# Patient Record
Sex: FEMALE | Race: BLACK OR AFRICAN AMERICAN | NOT HISPANIC OR LATINO | URBAN - METROPOLITAN AREA
[De-identification: names, ages, dates, MRNs, and addresses within clinical notes are randomized per-mention and may not be internally consistent; named-entity substitution may affect disease eponyms.]

---

## 2019-06-19 ENCOUNTER — APPOINTMENT (RX ONLY)
Dept: URBAN - METROPOLITAN AREA CLINIC 349 | Facility: CLINIC | Age: 24
Setting detail: DERMATOLOGY
End: 2019-06-19

## 2019-06-19 DIAGNOSIS — L81.9 DISORDER OF PIGMENTATION, UNSPECIFIED: ICD-10-CM

## 2019-06-19 DIAGNOSIS — L70.0 ACNE VULGARIS: ICD-10-CM

## 2019-06-19 DIAGNOSIS — L20.89 OTHER ATOPIC DERMATITIS: ICD-10-CM

## 2019-06-19 PROBLEM — L20.84 INTRINSIC (ALLERGIC) ECZEMA: Status: ACTIVE | Noted: 2019-06-19

## 2019-06-19 PROCEDURE — ? COUNSELING

## 2019-06-19 PROCEDURE — ? RECOMMENDATIONS

## 2019-06-19 PROCEDURE — ? PRESCRIPTION

## 2019-06-19 PROCEDURE — 99202 OFFICE O/P NEW SF 15 MIN: CPT

## 2019-06-19 RX ORDER — SULFACETAMIDE SODIUM, SULFUR 98; 48 MG/G; MG/G
LIQUID TOPICAL
Qty: 1 | Refills: 3 | Status: ERX | COMMUNITY
Start: 2019-06-19

## 2019-06-19 RX ORDER — TRIAMCINOLONE ACETONIDE 1 MG/G
CREAM TOPICAL
Qty: 1 | Refills: 1 | Status: ERX | COMMUNITY
Start: 2019-06-19

## 2019-06-19 RX ADMIN — TRIAMCINOLONE ACETONIDE: 1 CREAM TOPICAL at 19:15

## 2019-06-19 RX ADMIN — SULFACETAMIDE SODIUM, SULFUR: 98; 48 LIQUID TOPICAL at 19:17

## 2019-06-19 ASSESSMENT — LOCATION DETAILED DESCRIPTION DERM
LOCATION DETAILED: LEFT SUPERIOR MEDIAL FOREHEAD
LOCATION DETAILED: LEFT SUPERIOR FOREHEAD
LOCATION DETAILED: RIGHT FOREHEAD
LOCATION DETAILED: RIGHT INFERIOR FOREHEAD
LOCATION DETAILED: RIGHT RADIAL DORSAL HAND
LOCATION DETAILED: LEFT LATERAL FOREHEAD
LOCATION DETAILED: RIGHT ANTERIOR PROXIMAL THIGH
LOCATION DETAILED: SUPERIOR MID FOREHEAD
LOCATION DETAILED: LEFT FOREHEAD
LOCATION DETAILED: RIGHT LATERAL FOREHEAD
LOCATION DETAILED: LEFT MEDIAL FOREHEAD

## 2019-06-19 ASSESSMENT — LOCATION ZONE DERM
LOCATION ZONE: FACE
LOCATION ZONE: HAND
LOCATION ZONE: FACE
LOCATION ZONE: LEG

## 2019-06-19 ASSESSMENT — LOCATION SIMPLE DESCRIPTION DERM
LOCATION SIMPLE: RIGHT HAND
LOCATION SIMPLE: RIGHT THIGH
LOCATION SIMPLE: RIGHT FOREHEAD
LOCATION SIMPLE: RIGHT FOREHEAD
LOCATION SIMPLE: SUPERIOR FOREHEAD
LOCATION SIMPLE: LEFT FOREHEAD
LOCATION SIMPLE: LEFT FOREHEAD

## 2019-06-19 NOTE — HPI: COSMETIC (UNWANTED HAIR)
How Did Your Unwanted Hair Appear?: sudden in onset
How Severe Is Your Unwanted Hair?: mild
Additional History: Patient has PCOS

## 2019-06-19 NOTE — PROCEDURE: COUNSELING
Benzoyl Peroxide Counseling: Patient counseled that medicine may cause skin irritation and bleach clothing.  In the event of skin irritation, the patient was advised to reduce the amount of the drug applied or use it less frequently.   The patient verbalized understanding of the proper use and possible adverse effects of benzoyl peroxide.  All of the patient's questions and concerns were addressed.
Isotretinoin Pregnancy And Lactation Text: This medication is Pregnancy Category X and is considered extremely dangerous during pregnancy. It is unknown if it is excreted in breast milk.
Birth Control Pills Pregnancy And Lactation Text: This medication should be avoided if pregnant and for the first 30 days post-partum.
Include Pregnancy/Lactation Warning?: No
Tazorac Counseling:  Patient advised that medication is irritating and drying.  Patient may need to apply sparingly and wash off after an hour before eventually leaving it on overnight.  The patient verbalized understanding of the proper use and possible adverse effects of tazorac.  All of the patient's questions and concerns were addressed.
Detail Level: Zone
Azithromycin Counseling:  I discussed with the patient the risks of azithromycin including but not limited to GI upset, allergic reaction, drug rash, diarrhea, and yeast infections.
Minocycline Pregnancy And Lactation Text: This medication is Pregnancy Category D and not consider safe during pregnancy. It is also excreted in breast milk.
Tazorac Pregnancy And Lactation Text: This medication is not safe during pregnancy. It is unknown if this medication is excreted in breast milk.
Erythromycin Pregnancy And Lactation Text: This medication is Pregnancy Category B and is considered safe during pregnancy. It is also excreted in breast milk.
Topical Clindamycin Pregnancy And Lactation Text: This medication is Pregnancy Category B and is considered safe during pregnancy. It is unknown if it is excreted in breast milk.
Isotretinoin Counseling: Patient should get monthly blood tests, not donate blood, not drive at night if vision affected, not share medication, and not undergo elective surgery for 6 months after tx completed. Side effects reviewed, pt to contact office should one occur.
Bactrim Pregnancy And Lactation Text: This medication is Pregnancy Category D and is known to cause fetal risk.  It is also excreted in breast milk.
Tetracycline Counseling: Patient counseled regarding possible photosensitivity and increased risk for sunburn.  Patient instructed to avoid sunlight, if possible.  When exposed to sunlight, patients should wear protective clothing, sunglasses, and sunscreen.  The patient was instructed to call the office immediately if the following severe adverse effects occur:  hearing changes, easy bruising/bleeding, severe headache, or vision changes.  The patient verbalized understanding of the proper use and possible adverse effects of tetracycline.  All of the patient's questions and concerns were addressed. Patient understands to avoid pregnancy while on therapy due to potential birth defects.
Minocycline Counseling: Patient advised regarding possible photosensitivity and discoloration of the teeth, skin, lips, tongue and gums.  Patient instructed to avoid sunlight, if possible.  When exposed to sunlight, patients should wear protective clothing, sunglasses, and sunscreen.  The patient was instructed to call the office immediately if the following severe adverse effects occur:  hearing changes, easy bruising/bleeding, severe headache, or vision changes.  The patient verbalized understanding of the proper use and possible adverse effects of minocycline.  All of the patient's questions and concerns were addressed.
Spironolactone Counseling: Patient advised regarding risks of diarrhea, abdominal pain, hyperkalemia, birth defects (for female patients), liver toxicity and renal toxicity. The patient may need blood work to monitor liver and kidney function and potassium levels while on therapy. The patient verbalized understanding of the proper use and possible adverse effects of spironolactone.  All of the patient's questions and concerns were addressed.
Spironolactone Pregnancy And Lactation Text: This medication can cause feminization of the male fetus and should be avoided during pregnancy. The active metabolite is also found in breast milk.
Topical Retinoid counseling:  Patient advised to apply a pea-sized amount only at bedtime and wait 30 minutes after washing their face before applying.  If too drying, patient may add a non-comedogenic moisturizer. The patient verbalized understanding of the proper use and possible adverse effects of retinoids.  All of the patient's questions and concerns were addressed.
High Dose Vitamin A Pregnancy And Lactation Text: High dose vitamin A therapy is contraindicated during pregnancy and breast feeding.
Azithromycin Pregnancy And Lactation Text: This medication is considered safe during pregnancy and is also secreted in breast milk.
Doxycycline Pregnancy And Lactation Text: This medication is Pregnancy Category D and not consider safe during pregnancy. It is also excreted in breast milk but is considered safe for shorter treatment courses.
Birth Control Pills Counseling: Birth Control Pill Counseling: I discussed with the patient the potential side effects of OCPs including but not limited to increased risk of stroke, heart attack, thrombophlebitis, deep venous thrombosis, hepatic adenomas, breast changes, GI upset, headaches, and depression.  The patient verbalized understanding of the proper use and possible adverse effects of OCPs. All of the patient's questions and concerns were addressed.
Bactrim Counseling:  I discussed with the patient the risks of sulfa antibiotics including but not limited to GI upset, allergic reaction, drug rash, diarrhea, dizziness, photosensitivity, and yeast infections.  Rarely, more serious reactions can occur including but not limited to aplastic anemia, agranulocytosis, methemoglobinemia, blood dyscrasias, liver or kidney failure, lung infiltrates or desquamative/blistering drug rashes.
Benzoyl Peroxide Pregnancy And Lactation Text: This medication is Pregnancy Category C. It is unknown if benzoyl peroxide is excreted in breast milk.
Erythromycin Counseling:  I discussed with the patient the risks of erythromycin including but not limited to GI upset, allergic reaction, drug rash, diarrhea, increase in liver enzymes, and yeast infections.
Topical Sulfur Applications Counseling: Topical Sulfur Counseling: Patient counseled that this medication may cause skin irritation or allergic reactions.  In the event of skin irritation, the patient was advised to reduce the amount of the drug applied or use it less frequently.   The patient verbalized understanding of the proper use and possible adverse effects of topical sulfur application.  All of the patient's questions and concerns were addressed.
Doxycycline Counseling:  Patient counseled regarding possible photosensitivity and increased risk for sunburn.  Patient instructed to avoid sunlight, if possible.  When exposed to sunlight, patients should wear protective clothing, sunglasses, and sunscreen.  The patient was instructed to call the office immediately if the following severe adverse effects occur:  hearing changes, easy bruising/bleeding, severe headache, or vision changes.  The patient verbalized understanding of the proper use and possible adverse effects of doxycycline.  All of the patient's questions and concerns were addressed.
Dapsone Counseling: I discussed with the patient the risks of dapsone including but not limited to hemolytic anemia, agranulocytosis, rashes, methemoglobinemia, kidney failure, peripheral neuropathy, headaches, GI upset, and liver toxicity.  Patients who start dapsone require monitoring including baseline LFTs and weekly CBCs for the first month, then every month thereafter.  The patient verbalized understanding of the proper use and possible adverse effects of dapsone.  All of the patient's questions and concerns were addressed.
Topical Clindamycin Counseling: Patient counseled that this medication may cause skin irritation or allergic reactions.  In the event of skin irritation, the patient was advised to reduce the amount of the drug applied or use it less frequently.   The patient verbalized understanding of the proper use and possible adverse effects of clindamycin.  All of the patient's questions and concerns were addressed.
Topical Sulfur Applications Pregnancy And Lactation Text: This medication is Pregnancy Category C and has an unknown safety profile during pregnancy. It is unknown if this topical medication is excreted in breast milk.
Topical Retinoid Pregnancy And Lactation Text: This medication is Pregnancy Category C. It is unknown if this medication is excreted in breast milk.
High Dose Vitamin A Counseling: Side effects reviewed, pt to contact office should one occur.
Dapsone Pregnancy And Lactation Text: This medication is Pregnancy Category C and is not considered safe during pregnancy or breast feeding.

## 2019-09-11 ENCOUNTER — APPOINTMENT (RX ONLY)
Dept: URBAN - METROPOLITAN AREA CLINIC 349 | Facility: CLINIC | Age: 24
Setting detail: DERMATOLOGY
End: 2019-09-11

## 2019-09-11 DIAGNOSIS — L20.89 OTHER ATOPIC DERMATITIS: ICD-10-CM | Status: STABLE

## 2019-09-11 DIAGNOSIS — L81.9 DISORDER OF PIGMENTATION, UNSPECIFIED: ICD-10-CM

## 2019-09-11 DIAGNOSIS — L70.0 ACNE VULGARIS: ICD-10-CM | Status: IMPROVED

## 2019-09-11 PROBLEM — L20.84 INTRINSIC (ALLERGIC) ECZEMA: Status: ACTIVE | Noted: 2019-09-11

## 2019-09-11 PROBLEM — I10 ESSENTIAL (PRIMARY) HYPERTENSION: Status: ACTIVE | Noted: 2019-09-11

## 2019-09-11 PROCEDURE — ? RECOMMENDATIONS

## 2019-09-11 PROCEDURE — ? PRESCRIPTION

## 2019-09-11 PROCEDURE — ? TREATMENT REGIMEN

## 2019-09-11 PROCEDURE — ? COUNSELING

## 2019-09-11 PROCEDURE — 99213 OFFICE O/P EST LOW 20 MIN: CPT

## 2019-09-11 RX ORDER — TRIAMCINOLONE ACETONIDE 1 MG/G
CREAM TOPICAL
Qty: 1 | Refills: 1 | Status: ERX

## 2019-09-11 RX ORDER — SULFACETAMIDE SODIUM, SULFUR 98; 48 MG/G; MG/G
LIQUID TOPICAL
Qty: 1 | Refills: 3 | Status: ERX

## 2019-09-11 RX ORDER — FLUTICASONE PROPIONATE 0.05 MG/G
OINTMENT TOPICAL
Qty: 1 | Refills: 1 | Status: ERX | COMMUNITY
Start: 2019-09-11

## 2019-09-11 RX ADMIN — FLUTICASONE PROPIONATE: 0.05 OINTMENT TOPICAL at 19:46

## 2019-09-11 ASSESSMENT — LOCATION DETAILED DESCRIPTION DERM
LOCATION DETAILED: RIGHT ANTERIOR PROXIMAL THIGH
LOCATION DETAILED: LEFT ANTERIOR PROXIMAL THIGH
LOCATION DETAILED: RIGHT RADIAL DORSAL HAND
LOCATION DETAILED: RIGHT LATERAL FOREHEAD
LOCATION DETAILED: LEFT LATERAL FOREHEAD
LOCATION DETAILED: RIGHT ANTERIOR PROXIMAL UPPER ARM
LOCATION DETAILED: LEFT ANTERIOR PROXIMAL UPPER ARM
LOCATION DETAILED: RIGHT INFERIOR FOREHEAD
LOCATION DETAILED: LEFT SUPERIOR FOREHEAD
LOCATION DETAILED: LEFT FOREHEAD
LOCATION DETAILED: LEFT MEDIAL FOREHEAD
LOCATION DETAILED: SUPERIOR MID FOREHEAD
LOCATION DETAILED: LEFT SUPERIOR MEDIAL FOREHEAD
LOCATION DETAILED: RIGHT FOREHEAD

## 2019-09-11 ASSESSMENT — LOCATION ZONE DERM
LOCATION ZONE: HAND
LOCATION ZONE: FACE
LOCATION ZONE: LEG
LOCATION ZONE: ARM
LOCATION ZONE: FACE

## 2019-09-11 ASSESSMENT — LOCATION SIMPLE DESCRIPTION DERM
LOCATION SIMPLE: LEFT UPPER ARM
LOCATION SIMPLE: SUPERIOR FOREHEAD
LOCATION SIMPLE: RIGHT UPPER ARM
LOCATION SIMPLE: RIGHT FOREHEAD
LOCATION SIMPLE: LEFT FOREHEAD
LOCATION SIMPLE: LEFT THIGH
LOCATION SIMPLE: RIGHT HAND
LOCATION SIMPLE: RIGHT THIGH
LOCATION SIMPLE: LEFT FOREHEAD
LOCATION SIMPLE: RIGHT FOREHEAD

## 2019-09-11 NOTE — PROCEDURE: COUNSELING
Spironolactone Counseling: Patient advised regarding risks of diarrhea, abdominal pain, hyperkalemia, birth defects (for female patients), liver toxicity and renal toxicity. The patient may need blood work to monitor liver and kidney function and potassium levels while on therapy. The patient verbalized understanding of the proper use and possible adverse effects of spironolactone.  All of the patient's questions and concerns were addressed.
Detail Level: Zone
Bactrim Pregnancy And Lactation Text: This medication is Pregnancy Category D and is known to cause fetal risk.  It is also excreted in breast milk.
Erythromycin Counseling:  I discussed with the patient the risks of erythromycin including but not limited to GI upset, allergic reaction, drug rash, diarrhea, increase in liver enzymes, and yeast infections.
Benzoyl Peroxide Pregnancy And Lactation Text: This medication is Pregnancy Category C. It is unknown if benzoyl peroxide is excreted in breast milk.
Tazorac Pregnancy And Lactation Text: This medication is not safe during pregnancy. It is unknown if this medication is excreted in breast milk.
High Dose Vitamin A Counseling: Side effects reviewed, pt to contact office should one occur.
Dapsone Pregnancy And Lactation Text: This medication is Pregnancy Category C and is not considered safe during pregnancy or breast feeding.
Benzoyl Peroxide Counseling: Patient counseled that medicine may cause skin irritation and bleach clothing.  In the event of skin irritation, the patient was advised to reduce the amount of the drug applied or use it less frequently.   The patient verbalized understanding of the proper use and possible adverse effects of benzoyl peroxide.  All of the patient's questions and concerns were addressed.
Spironolactone Pregnancy And Lactation Text: This medication can cause feminization of the male fetus and should be avoided during pregnancy. The active metabolite is also found in breast milk.
Topical Sulfur Applications Counseling: Topical Sulfur Counseling: Patient counseled that this medication may cause skin irritation or allergic reactions.  In the event of skin irritation, the patient was advised to reduce the amount of the drug applied or use it less frequently.   The patient verbalized understanding of the proper use and possible adverse effects of topical sulfur application.  All of the patient's questions and concerns were addressed.
Dapsone Counseling: I discussed with the patient the risks of dapsone including but not limited to hemolytic anemia, agranulocytosis, rashes, methemoglobinemia, kidney failure, peripheral neuropathy, headaches, GI upset, and liver toxicity.  Patients who start dapsone require monitoring including baseline LFTs and weekly CBCs for the first month, then every month thereafter.  The patient verbalized understanding of the proper use and possible adverse effects of dapsone.  All of the patient's questions and concerns were addressed.
Doxycycline Counseling:  Patient counseled regarding possible photosensitivity and increased risk for sunburn.  Patient instructed to avoid sunlight, if possible.  When exposed to sunlight, patients should wear protective clothing, sunglasses, and sunscreen.  The patient was instructed to call the office immediately if the following severe adverse effects occur:  hearing changes, easy bruising/bleeding, severe headache, or vision changes.  The patient verbalized understanding of the proper use and possible adverse effects of doxycycline.  All of the patient's questions and concerns were addressed.
Use Enhanced Medication Counseling?: No
High Dose Vitamin A Pregnancy And Lactation Text: High dose vitamin A therapy is contraindicated during pregnancy and breast feeding.
Tazorac Counseling:  Patient advised that medication is irritating and drying.  Patient may need to apply sparingly and wash off after an hour before eventually leaving it on overnight.  The patient verbalized understanding of the proper use and possible adverse effects of tazorac.  All of the patient's questions and concerns were addressed.
Birth Control Pills Pregnancy And Lactation Text: This medication should be avoided if pregnant and for the first 30 days post-partum.
Tetracycline Counseling: Patient counseled regarding possible photosensitivity and increased risk for sunburn.  Patient instructed to avoid sunlight, if possible.  When exposed to sunlight, patients should wear protective clothing, sunglasses, and sunscreen.  The patient was instructed to call the office immediately if the following severe adverse effects occur:  hearing changes, easy bruising/bleeding, severe headache, or vision changes.  The patient verbalized understanding of the proper use and possible adverse effects of tetracycline.  All of the patient's questions and concerns were addressed. Patient understands to avoid pregnancy while on therapy due to potential birth defects.
Bactrim Counseling:  I discussed with the patient the risks of sulfa antibiotics including but not limited to GI upset, allergic reaction, drug rash, diarrhea, dizziness, photosensitivity, and yeast infections.  Rarely, more serious reactions can occur including but not limited to aplastic anemia, agranulocytosis, methemoglobinemia, blood dyscrasias, liver or kidney failure, lung infiltrates or desquamative/blistering drug rashes.
Minocycline Counseling: Patient advised regarding possible photosensitivity and discoloration of the teeth, skin, lips, tongue and gums.  Patient instructed to avoid sunlight, if possible.  When exposed to sunlight, patients should wear protective clothing, sunglasses, and sunscreen.  The patient was instructed to call the office immediately if the following severe adverse effects occur:  hearing changes, easy bruising/bleeding, severe headache, or vision changes.  The patient verbalized understanding of the proper use and possible adverse effects of minocycline.  All of the patient's questions and concerns were addressed.
Topical Retinoid counseling:  Patient advised to apply a pea-sized amount only at bedtime and wait 30 minutes after washing their face before applying.  If too drying, patient may add a non-comedogenic moisturizer. The patient verbalized understanding of the proper use and possible adverse effects of retinoids.  All of the patient's questions and concerns were addressed.
Doxycycline Pregnancy And Lactation Text: This medication is Pregnancy Category D and not consider safe during pregnancy. It is also excreted in breast milk but is considered safe for shorter treatment courses.
Erythromycin Pregnancy And Lactation Text: This medication is Pregnancy Category B and is considered safe during pregnancy. It is also excreted in breast milk.
Topical Clindamycin Counseling: Patient counseled that this medication may cause skin irritation or allergic reactions.  In the event of skin irritation, the patient was advised to reduce the amount of the drug applied or use it less frequently.   The patient verbalized understanding of the proper use and possible adverse effects of clindamycin.  All of the patient's questions and concerns were addressed.
Azithromycin Counseling:  I discussed with the patient the risks of azithromycin including but not limited to GI upset, allergic reaction, drug rash, diarrhea, and yeast infections.
Isotretinoin Counseling: Patient should get monthly blood tests, not donate blood, not drive at night if vision affected, not share medication, and not undergo elective surgery for 6 months after tx completed. Side effects reviewed, pt to contact office should one occur.
Topical Clindamycin Pregnancy And Lactation Text: This medication is Pregnancy Category B and is considered safe during pregnancy. It is unknown if it is excreted in breast milk.
Isotretinoin Pregnancy And Lactation Text: This medication is Pregnancy Category X and is considered extremely dangerous during pregnancy. It is unknown if it is excreted in breast milk.
Minocycline Pregnancy And Lactation Text: This medication is Pregnancy Category D and not consider safe during pregnancy. It is also excreted in breast milk.
Birth Control Pills Counseling: Birth Control Pill Counseling: I discussed with the patient the potential side effects of OCPs including but not limited to increased risk of stroke, heart attack, thrombophlebitis, deep venous thrombosis, hepatic adenomas, breast changes, GI upset, headaches, and depression.  The patient verbalized understanding of the proper use and possible adverse effects of OCPs. All of the patient's questions and concerns were addressed.
Topical Retinoid Pregnancy And Lactation Text: This medication is Pregnancy Category C. It is unknown if this medication is excreted in breast milk.
Topical Sulfur Applications Pregnancy And Lactation Text: This medication is Pregnancy Category C and has an unknown safety profile during pregnancy. It is unknown if this topical medication is excreted in breast milk.
Azithromycin Pregnancy And Lactation Text: This medication is considered safe during pregnancy and is also secreted in breast milk.

## 2020-10-22 NOTE — PROGRESS NOTES
CC:  Large breasts with pain    HPI:  26 yo BF several years hx of very large breasts, asymmetry, and upper back, neck base and shoulder pain. No breast gland symptoms. Has obesity. 5'8\" 280 #. Breasts 38 G+, wants D cup size. Meds: Amlodipine, Phentermine    NKDA    Exam:        Breasts:  Large, ptotic, w/o masses, LEFT larger possible 400 gm+. Estimate resection R 800+, L 1000+    A/P    Symptomatic breast hypertrophy for reduction mammaplasty.

## 2020-10-28 ENCOUNTER — HOSPITAL ENCOUNTER (OUTPATIENT)
Dept: SURGERY | Age: 25
Discharge: HOME | End: 2020-10-28

## 2020-10-28 VITALS — HEIGHT: 68 IN | BODY MASS INDEX: 42.44 KG/M2 | WEIGHT: 280 LBS

## 2020-10-28 RX ORDER — AMLODIPINE BESYLATE 10 MG/1
10 TABLET ORAL DAILY
COMMUNITY
End: 2021-02-03

## 2020-10-28 RX ORDER — BISMUTH SUBSALICYLATE 262 MG
1 TABLET,CHEWABLE ORAL DAILY
COMMUNITY
End: 2021-02-02

## 2020-10-28 NOTE — PERIOP NOTES
Patient verified name and . Order for consent NOT found in EHR; patient verifies procedure. Type 2 surgery, phone assessment complete. Orders NOT received. Labs per surgeon: no orders in EMR- pt states she has preop appt today  Labs per anesthesia protocol: hgb needed- Pt instructed to go to O/P lab ([facility], [place] [address]) - between the hours of [id number] prior to the day of surgery. Pt verbalized understanding and states can go on Friday. Patient answered medical/surgical history questions at their best of ability. All prior to admission medications documented in [facility]. Patient instructed to take the following medications the day of surgery according to anesthesia guidelines with a small sip of water: amlodipine. Hold all vitamins 7 days prior to surgery and NSAIDS 5 days prior to surgery. Prescription meds to hold: none. Patient instructed on the following:    > a negative Covid swab result is required to proceed with surgery;  appointments are made by the surgeon office and test should be collected 7 days prior to surgery. The testing center is located at the [facility] [address]. Pt states surgeon office told her the hospital would be calling her to make appt so at this time, pt has not had COVID screening. Pt instructed to confirm details with surgeon's office today at preop appt- pt aware PAT dept does not make these appts. > 1 visitor allowed at this time. >Arrive at [facility], C Entrance, time of arrival to be called the day before by 1700. >NPO after midnight including gum, mints, and ice chips. >Responsible adult must drive patient to the hospital, stay during surgery, and patient will need supervision 24 hours after anesthesia. >Use antibacterial soap in shower the night before surgery and on the morning of surgery.   >All piercings must be removed prior to arrival.    >Leave all valuables (money and jewelry) at home but bring insurance card and ID on       DOS. >Do not wear make-up, nail polish, lotions, cologne, perfumes, powders, or oil on skin.

## 2020-10-30 ENCOUNTER — HOSPITAL ENCOUNTER (OUTPATIENT)
Dept: LAB | Age: 25
Discharge: HOME | End: 2020-10-30
Attending: PLASTIC SURGERY
Payer: COMMERCIAL

## 2020-10-30 LAB — HGB BLD-MCNC: 11.5 G/DL (ref 11.7–15.4)

## 2020-10-30 PROCEDURE — 36415 COLL VENOUS BLD VENIPUNCTURE: CPT

## 2020-10-30 PROCEDURE — 85018 HEMOGLOBIN: CPT

## 2020-11-02 ENCOUNTER — ANESTHESIA EVENT (OUTPATIENT)
Dept: SURGERY | Age: 25
End: 2020-11-02
Payer: COMMERCIAL

## 2020-11-02 NOTE — PERIOP NOTES
HGB done 10/30/20 within [facility] protocols. Results for Jacquie Temple (MRN [id number]) as of 11/2/2020 10:15   Ref.  Range 10/30/2020 15:05   HGB Latest Ref Range: 11.7 - 15.4 g/dL 11.5 (L)

## 2020-11-02 NOTE — PERIOP NOTES
HGB done 10/30/20 within anesthesia protocols.     HEMOGLOBIN [[id number]] (Order [id number])   Lab   Date: 10/30/2020  Department: [facility]  Released By: Talha Brito RN (auto-released)  Authorizing: Esteban Trammell MD    Component  Value  Flag  Ref Range  Units  Status    HGB  11.5  Low

## 2020-11-03 ENCOUNTER — HOSPITAL ENCOUNTER (OUTPATIENT)
Age: 25
Setting detail: OUTPATIENT SURGERY
Discharge: HOME | End: 2020-11-03
Attending: PLASTIC SURGERY | Admitting: PLASTIC SURGERY
Payer: COMMERCIAL

## 2020-11-03 ENCOUNTER — ANESTHESIA (OUTPATIENT)
Dept: SURGERY | Age: 25
End: 2020-11-03
Payer: COMMERCIAL

## 2020-11-03 VITALS
TEMPERATURE: 97.9 F | BODY MASS INDEX: 44.41 KG/M2 | WEIGHT: 293 LBS | DIASTOLIC BLOOD PRESSURE: 79 MMHG | RESPIRATION RATE: 16 BRPM | HEART RATE: 78 BPM | OXYGEN SATURATION: 100 % | SYSTOLIC BLOOD PRESSURE: 134 MMHG | HEIGHT: 68 IN

## 2020-11-03 LAB — HCG UR QL: NEGATIVE

## 2020-11-03 PROCEDURE — 77030008534 HC TBNG LIPOSUC BYRO -B: Performed by: PLASTIC SURGERY

## 2020-11-03 PROCEDURE — 74011250636 HC RX REV CODE- 250/636: Performed by: ANESTHESIOLOGY

## 2020-11-03 PROCEDURE — 76010000173 HC OR TIME 3 TO 3.5 HR INTENSV-TIER 1: Performed by: PLASTIC SURGERY

## 2020-11-03 PROCEDURE — 77030008459 HC STPLR SKN COOP -B: Performed by: PLASTIC SURGERY

## 2020-11-03 PROCEDURE — 88307 TISSUE EXAM BY PATHOLOGIST: CPT

## 2020-11-03 PROCEDURE — 77030037088 HC TUBE ENDOTRACH ORAL NSL COVD-A: Performed by: ANESTHESIOLOGY

## 2020-11-03 PROCEDURE — 77030040361 HC SLV COMPR DVT MDII -B: Performed by: PLASTIC SURGERY

## 2020-11-03 PROCEDURE — 77030039425 HC BLD LARYNG TRULITE DISP TELE -A: Performed by: ANESTHESIOLOGY

## 2020-11-03 PROCEDURE — 74011250636 HC RX REV CODE- 250/636: Performed by: NURSE ANESTHETIST, CERTIFIED REGISTERED

## 2020-11-03 PROCEDURE — 76060000037 HC ANESTHESIA 3 TO 3.5 HR: Performed by: PLASTIC SURGERY

## 2020-11-03 PROCEDURE — 74011250636 HC RX REV CODE- 250/636: Performed by: PLASTIC SURGERY

## 2020-11-03 PROCEDURE — 77030031139 HC SUT VCRL2 J&J -A: Performed by: PLASTIC SURGERY

## 2020-11-03 PROCEDURE — 2709999900 HC NON-CHARGEABLE SUPPLY: Performed by: PLASTIC SURGERY

## 2020-11-03 PROCEDURE — 77030019908 HC STETH ESOPH SIMS -A: Performed by: NURSE ANESTHETIST, CERTIFIED REGISTERED

## 2020-11-03 PROCEDURE — 77030002966 HC SUT PDS J&J -A: Performed by: PLASTIC SURGERY

## 2020-11-03 PROCEDURE — 77030027515 HC TBNG LIPO SUC MDCO -B: Performed by: PLASTIC SURGERY

## 2020-11-03 PROCEDURE — 74011000250 HC RX REV CODE- 250: Performed by: NURSE ANESTHETIST, CERTIFIED REGISTERED

## 2020-11-03 PROCEDURE — 81025 URINE PREGNANCY TEST: CPT

## 2020-11-03 PROCEDURE — 77030040922 HC BLNKT HYPOTHRM STRY -A: Performed by: NURSE ANESTHETIST, CERTIFIED REGISTERED

## 2020-11-03 PROCEDURE — 76210000006 HC OR PH I REC 0.5 TO 1 HR: Performed by: PLASTIC SURGERY

## 2020-11-03 PROCEDURE — 74011000258 HC RX REV CODE- 258: Performed by: PLASTIC SURGERY

## 2020-11-03 PROCEDURE — 74011250637 HC RX REV CODE- 250/637: Performed by: ANESTHESIOLOGY

## 2020-11-03 PROCEDURE — 76210000020 HC REC RM PH II FIRST 0.5 HR: Performed by: PLASTIC SURGERY

## 2020-11-03 PROCEDURE — 74011000250 HC RX REV CODE- 250: Performed by: ANESTHESIOLOGY

## 2020-11-03 PROCEDURE — 74011000250 HC RX REV CODE- 250: Performed by: PLASTIC SURGERY

## 2020-11-03 RX ORDER — NEOSTIGMINE METHYLSULFATE 1 MG/ML
INJECTION, SOLUTION INTRAVENOUS AS NEEDED
Status: DISCONTINUED | OUTPATIENT
Start: 2020-11-03 | End: 2020-11-03 | Stop reason: HOSPADM

## 2020-11-03 RX ORDER — HYDROCODONE BITARTRATE AND ACETAMINOPHEN 5; 325 MG/1; MG/1
2 TABLET ORAL AS NEEDED
Status: DISCONTINUED | OUTPATIENT
Start: 2020-11-03 | End: 2020-11-03 | Stop reason: HOSPADM

## 2020-11-03 RX ORDER — BACITRACIN ZINC 500 UNIT/G
OINTMENT (GRAM) TOPICAL AS NEEDED
Status: DISCONTINUED | OUTPATIENT
Start: 2020-11-03 | End: 2020-11-03 | Stop reason: HOSPADM

## 2020-11-03 RX ORDER — BUPIVACAINE HYDROCHLORIDE 5 MG/ML
INJECTION, SOLUTION EPIDURAL; INTRACAUDAL AS NEEDED
Status: DISCONTINUED | OUTPATIENT
Start: 2020-11-03 | End: 2020-11-03 | Stop reason: HOSPADM

## 2020-11-03 RX ORDER — GLYCOPYRROLATE 0.2 MG/ML
INJECTION INTRAMUSCULAR; INTRAVENOUS AS NEEDED
Status: DISCONTINUED | OUTPATIENT
Start: 2020-11-03 | End: 2020-11-03 | Stop reason: HOSPADM

## 2020-11-03 RX ORDER — FENTANYL CITRATE 50 UG/ML
INJECTION, SOLUTION INTRAMUSCULAR; INTRAVENOUS AS NEEDED
Status: DISCONTINUED | OUTPATIENT
Start: 2020-11-03 | End: 2020-11-03 | Stop reason: HOSPADM

## 2020-11-03 RX ORDER — MIDAZOLAM HYDROCHLORIDE 1 MG/ML
2 INJECTION, SOLUTION INTRAMUSCULAR; INTRAVENOUS
Status: COMPLETED | OUTPATIENT
Start: 2020-11-03 | End: 2020-11-03

## 2020-11-03 RX ORDER — OXYCODONE HYDROCHLORIDE 5 MG/1
5 TABLET ORAL
Status: DISCONTINUED | OUTPATIENT
Start: 2020-11-03 | End: 2020-11-03 | Stop reason: HOSPADM

## 2020-11-03 RX ORDER — CELECOXIB 200 MG/1
200 CAPSULE ORAL ONCE
COMMUNITY
End: 2021-02-02

## 2020-11-03 RX ORDER — SODIUM CHLORIDE, SODIUM LACTATE, POTASSIUM CHLORIDE, CALCIUM CHLORIDE 600; 310; 30; 20 MG/100ML; MG/100ML; MG/100ML; MG/100ML
75 INJECTION, SOLUTION INTRAVENOUS CONTINUOUS
Status: DISCONTINUED | OUTPATIENT
Start: 2020-11-03 | End: 2020-11-03 | Stop reason: HOSPADM

## 2020-11-03 RX ORDER — PROPOFOL 10 MG/ML
INJECTION, EMULSION INTRAVENOUS AS NEEDED
Status: DISCONTINUED | OUTPATIENT
Start: 2020-11-03 | End: 2020-11-03 | Stop reason: HOSPADM

## 2020-11-03 RX ORDER — DEXAMETHASONE SODIUM PHOSPHATE 4 MG/ML
INJECTION, SOLUTION INTRA-ARTICULAR; INTRALESIONAL; INTRAMUSCULAR; INTRAVENOUS; SOFT TISSUE AS NEEDED
Status: DISCONTINUED | OUTPATIENT
Start: 2020-11-03 | End: 2020-11-03 | Stop reason: HOSPADM

## 2020-11-03 RX ORDER — LIDOCAINE HYDROCHLORIDE 20 MG/ML
INJECTION, SOLUTION EPIDURAL; INFILTRATION; INTRACAUDAL; PERINEURAL AS NEEDED
Status: DISCONTINUED | OUTPATIENT
Start: 2020-11-03 | End: 2020-11-03 | Stop reason: HOSPADM

## 2020-11-03 RX ORDER — CEFAZOLIN SODIUM 1 G/3ML
INJECTION, POWDER, FOR SOLUTION INTRAMUSCULAR; INTRAVENOUS AS NEEDED
Status: DISCONTINUED | OUTPATIENT
Start: 2020-11-03 | End: 2020-11-03 | Stop reason: HOSPADM

## 2020-11-03 RX ORDER — HYDROMORPHONE HYDROCHLORIDE 2 MG/ML
0.5 INJECTION, SOLUTION INTRAMUSCULAR; INTRAVENOUS; SUBCUTANEOUS
Status: DISCONTINUED | OUTPATIENT
Start: 2020-11-03 | End: 2020-11-03 | Stop reason: HOSPADM

## 2020-11-03 RX ORDER — FENTANYL CITRATE 50 UG/ML
100 INJECTION, SOLUTION INTRAMUSCULAR; INTRAVENOUS ONCE
Status: DISCONTINUED | OUTPATIENT
Start: 2020-11-03 | End: 2020-11-03 | Stop reason: HOSPADM

## 2020-11-03 RX ORDER — SODIUM CHLORIDE 0.9 % (FLUSH) 0.9 %
5-40 SYRINGE (ML) INJECTION EVERY 8 HOURS
Status: CANCELLED | OUTPATIENT
Start: 2020-11-03

## 2020-11-03 RX ORDER — ROCURONIUM BROMIDE 10 MG/ML
INJECTION, SOLUTION INTRAVENOUS AS NEEDED
Status: DISCONTINUED | OUTPATIENT
Start: 2020-11-03 | End: 2020-11-03 | Stop reason: HOSPADM

## 2020-11-03 RX ORDER — CEFAZOLIN SODIUM/WATER 2 G/20 ML
SYRINGE (ML) INTRAVENOUS AS NEEDED
Status: DISCONTINUED | OUTPATIENT
Start: 2020-11-03 | End: 2020-11-03 | Stop reason: HOSPADM

## 2020-11-03 RX ORDER — LIDOCAINE HYDROCHLORIDE 10 MG/ML
0.1 INJECTION INFILTRATION; PERINEURAL AS NEEDED
Status: DISCONTINUED | OUTPATIENT
Start: 2020-11-03 | End: 2020-11-03 | Stop reason: HOSPADM

## 2020-11-03 RX ORDER — CEFAZOLIN SODIUM/WATER 2 G/20 ML
2 SYRINGE (ML) INTRAVENOUS ONCE
Status: CANCELLED | OUTPATIENT
Start: 2020-11-03 | End: 2020-11-03

## 2020-11-03 RX ORDER — SODIUM CHLORIDE 0.9 % (FLUSH) 0.9 %
5-40 SYRINGE (ML) INJECTION AS NEEDED
Status: CANCELLED | OUTPATIENT
Start: 2020-11-03

## 2020-11-03 RX ORDER — ONDANSETRON 2 MG/ML
INJECTION INTRAMUSCULAR; INTRAVENOUS AS NEEDED
Status: DISCONTINUED | OUTPATIENT
Start: 2020-11-03 | End: 2020-11-03 | Stop reason: HOSPADM

## 2020-11-03 RX ORDER — CEFAZOLIN SODIUM/WATER 2 G/20 ML
SYRINGE (ML) INTRAVENOUS AS NEEDED
Status: DISCONTINUED | OUTPATIENT
Start: 2020-11-03 | End: 2020-11-03

## 2020-11-03 RX ADMIN — FENTANYL CITRATE 50 MCG: 50 INJECTION INTRAMUSCULAR; INTRAVENOUS at 11:21

## 2020-11-03 RX ADMIN — ROCURONIUM BROMIDE 40 MG: 10 INJECTION, SOLUTION INTRAVENOUS at 10:09

## 2020-11-03 RX ADMIN — FENTANYL CITRATE 50 MCG: 50 INJECTION INTRAMUSCULAR; INTRAVENOUS at 11:24

## 2020-11-03 RX ADMIN — FENTANYL CITRATE 50 MCG: 50 INJECTION INTRAMUSCULAR; INTRAVENOUS at 10:02

## 2020-11-03 RX ADMIN — Medication 3 MG: at 12:58

## 2020-11-03 RX ADMIN — MIDAZOLAM 2 MG: 1 INJECTION INTRAMUSCULAR; INTRAVENOUS at 09:48

## 2020-11-03 RX ADMIN — HYDROMORPHONE HYDROCHLORIDE 0.5 MG: 2 INJECTION INTRAMUSCULAR; INTRAVENOUS; SUBCUTANEOUS at 13:41

## 2020-11-03 RX ADMIN — DEXAMETHASONE SODIUM PHOSPHATE 10 MG: 4 INJECTION, SOLUTION INTRAMUSCULAR; INTRAVENOUS at 10:20

## 2020-11-03 RX ADMIN — Medication 3 G: at 10:12

## 2020-11-03 RX ADMIN — LIDOCAINE HYDROCHLORIDE 0.1 ML: 10 INJECTION, SOLUTION INFILTRATION; PERINEURAL at 09:38

## 2020-11-03 RX ADMIN — PROPOFOL 100 MG: 10 INJECTION, EMULSION INTRAVENOUS at 11:24

## 2020-11-03 RX ADMIN — SODIUM CHLORIDE, SODIUM LACTATE, POTASSIUM CHLORIDE, AND CALCIUM CHLORIDE: 600; 310; 30; 20 INJECTION, SOLUTION INTRAVENOUS at 09:35

## 2020-11-03 RX ADMIN — ONDANSETRON 4 MG: 2 INJECTION INTRAMUSCULAR; INTRAVENOUS at 12:51

## 2020-11-03 RX ADMIN — ROCURONIUM BROMIDE 10 MG: 10 INJECTION, SOLUTION INTRAVENOUS at 11:23

## 2020-11-03 RX ADMIN — FENTANYL CITRATE 50 MCG: 50 INJECTION INTRAMUSCULAR; INTRAVENOUS at 10:11

## 2020-11-03 RX ADMIN — GLYCOPYRROLATE 0.4 MG: 0.2 INJECTION, SOLUTION INTRAMUSCULAR; INTRAVENOUS at 12:57

## 2020-11-03 RX ADMIN — PROPOFOL 200 MG: 10 INJECTION, EMULSION INTRAVENOUS at 10:07

## 2020-11-03 RX ADMIN — SODIUM CHLORIDE, SODIUM LACTATE, POTASSIUM CHLORIDE, AND CALCIUM CHLORIDE: 600; 310; 30; 20 INJECTION, SOLUTION INTRAVENOUS at 11:52

## 2020-11-03 RX ADMIN — SODIUM CHLORIDE, SODIUM LACTATE, POTASSIUM CHLORIDE, AND CALCIUM CHLORIDE 75 ML/HR: 600; 310; 30; 20 INJECTION, SOLUTION INTRAVENOUS at 09:39

## 2020-11-03 RX ADMIN — HYDROCODONE BITARTRATE AND ACETAMINOPHEN 2 TABLET: 5; 325 TABLET ORAL at 14:18

## 2020-11-03 RX ADMIN — LIDOCAINE HYDROCHLORIDE 80 MG: 20 INJECTION, SOLUTION EPIDURAL; INFILTRATION; INTRACAUDAL; PERINEURAL at 10:06

## 2020-11-03 NOTE — DISCHARGE INSTRUCTIONS
BREAST SURGERY    ACTIVITY  · As tolerated and as directed by your doctor. · Bathe or shower as directed by your doctor. · Avoid heavy lifting or pulling (no more than 5 pounds). · Wear bra 24 hours a day if comfortable. DIET  · Clear liquids until no nausea or vomiting; then light diet for the first day. · Advance to regular diet on second day, unless directed by your doctor. · If nausea and vomiting continues, call your doctor. PAIN  · Take pain medication as directed by your doctor. · Call your doctor if pain is  NOT relieved by medication. · DO NOT take aspirin or blood thinners until directed by your doctor. DRESSING CARE : Per Surgeon Instructions, Keep intact until post operative appointment. CALL YOUR DOCTOR IF   · Excessive bleeding that does not stop after holding mild pressure over the area. After general anesthesia or intravenous sedation, for 24 hours or while taking prescription Narcotics:  · Limit your activities  · Do not drive and operate hazardous machinery  · Do not make important personal or business decisions  · Do  not drink alcoholic beverages  · If you have not urinated within 8 hours after discharge, please contact your surgeon on call. *  Please give a list of your current medications to your Primary Care Provider. *  Please update this list whenever your medications are discontinued, doses are      changed, or new medications (including over-the-counter products) are added. *  Please carry medication information at all times in case of emergency situations. These are general instructions for a healthy lifestyle:    No smoking/ No tobacco products/ Avoid exposure to second hand smoke    Surgeon General's Warning:  Quitting smoking now greatly reduces serious risk to your health.     Obesity, smoking, and sedentary lifestyle greatly increases your risk for illness    A healthy diet, regular physical exercise & weight monitoring are important for maintaining a healthy lifestyle    You may be retaining fluid if you have a history of heart failure or if you experience any of the following symptoms:  Weight gain of 3 pounds or more overnight or 5 pounds in a week, increased swelling in our hands or feet or shortness of breath while lying flat in bed. Please call your doctor as soon as you notice any of these symptoms; do not wait until your next office visit. Recognize signs and symptoms of STROKE:    F-face looks uneven    A-arms unable to move or move unevenly    S-speech slurred or non-existent    T-time-call 911 as soon as signs and symptoms begin-DO NOT go       Back to bed or wait to see if you get better-TIME IS BRAIN.

## 2020-11-03 NOTE — ANESTHESIA POSTPROCEDURE EVALUATION
Procedure(s):  BREAST REDUCTION BILATERAL.     general    Anesthesia Post Evaluation      Multimodal analgesia: multimodal analgesia used between 6 hours prior to anesthesia start to PACU discharge  Patient location during evaluation: PACU  Patient participation: complete - patient participated  Level of consciousness: awake and alert  Pain management: adequate  Airway patency: patent  Anesthetic complications: no  Cardiovascular status: acceptable  Respiratory status: acceptable  Hydration status: acceptable  Post anesthesia nausea and vomiting:  none  Final Post Anesthesia Temperature Assessment:  Normothermia (36.0-37.5 degrees C)      INITIAL Post-op Vital signs:   Vitals Value Taken Time   /79 11/3/2020  1:53 PM   Temp 36.6 °C (97.9 °F) 11/3/2020  1:23 PM   Pulse 78 11/3/2020  1:53 PM   Resp 16 11/3/2020  1:53 PM   SpO2 100 % 11/3/2020  1:53 PM

## 2020-11-03 NOTE — H&P
History and Physical      Review of Systems    Physical Exam    CC:  Large breasts and associated symptoms    HPI:  26 yo BF with years hx very large breasts, shoulder, upper back and neck base pain. Breasts did not get smaller when she lost weight previously. 34 G+ cup, wants D cup. I estimated 40 F cup by measurement. Left larger, possibly 400 gm. PMH: HBP, o/w healthy    NKDA    EXAM:  Alert, oriented  Chest clear  Heart  RRR, no M  Breasts asymmetric, L larger, ptotic, no masses    A/P:  Symptomatic breast hypertrophy and asymmetry for reduction mammaplasty.

## 2020-11-03 NOTE — ANESTHESIA PREPROCEDURE EVALUATION
Relevant Problems   No relevant active problems       Anesthetic History               Review of Systems / Medical History  Patient summary reviewed and pertinent labs reviewed    Pulmonary                   Neuro/Psych              Cardiovascular    Hypertension              Exercise tolerance: >4 METS     GI/Hepatic/Renal                Endo/Other        Morbid obesity     Other Findings            Physical Exam    Airway  Mallampati: II  TM Distance: > 6 cm  Neck ROM: normal range of motion   Mouth opening: Normal     Cardiovascular  Regular rate and rhythm,  S1 and S2 normal,  no murmur, click, rub, or gallop             Dental  No notable dental hx       Pulmonary  Breath sounds clear to auscultation               Abdominal         Other Findings            Anesthetic Plan    ASA: 3  Anesthesia type: general            Anesthetic plan and risks discussed with: Patient

## 2021-03-03 PROBLEM — O99.019 ANEMIA COMPLICATING PREGNANCY: Status: ACTIVE | Noted: 2021-03-03

## 2021-03-11 PROBLEM — E66.01 CLASS 3 SEVERE OBESITY DUE TO EXCESS CALORIES WITH BODY MASS INDEX (BMI) OF 45.0 TO 49.9 IN ADULT (HCC): Status: ACTIVE | Noted: 2021-03-11

## 2021-03-11 PROBLEM — O99.210 OBESITY COMPLICATING PREGNANCY: Status: ACTIVE | Noted: 2021-03-11

## 2021-03-11 PROBLEM — O09.90 HIGH-RISK PREGNANCY: Status: ACTIVE | Noted: 2021-03-11

## 2021-03-11 PROBLEM — O10.919 CHRONIC HYPERTENSION AFFECTING PREGNANCY: Status: ACTIVE | Noted: 2021-03-11

## 2021-03-16 PROBLEM — A74.9 CHLAMYDIA INFECTION AFFECTING PREGNANCY IN FIRST TRIMESTER: Status: ACTIVE | Noted: 2021-03-16

## 2021-03-16 PROBLEM — O98.811 CHLAMYDIA INFECTION AFFECTING PREGNANCY IN FIRST TRIMESTER: Status: ACTIVE | Noted: 2021-03-16

## 2021-03-22 PROBLEM — Z3A.13 13 WEEKS GESTATION OF PREGNANCY: Status: ACTIVE | Noted: 2021-03-22

## 2021-03-22 PROBLEM — F41.9 ANXIETY: Status: ACTIVE | Noted: 2019-05-17

## 2021-03-29 ENCOUNTER — TELEPHONE (OUTPATIENT)
Dept: NUTRITION | Age: 26
End: 2021-03-29

## 2021-03-29 NOTE — TELEPHONE ENCOUNTER
Nutrition Counseling: Contacted pt regarding referral. See notes documented in Nutrition Counseling Referral for details. No further follow-up contact from pt. Will close referral for this office.     [facility]  [phone]

## 2021-04-12 PROBLEM — Z3A.13 13 WEEKS GESTATION OF PREGNANCY: Status: RESOLVED | Noted: 2021-03-22 | Resolved: 2021-04-12

## 2021-05-10 PROBLEM — O99.012 ANEMIA AFFECTING PREGNANCY IN SECOND TRIMESTER: Status: ACTIVE | Noted: 2021-03-03

## 2021-05-10 PROBLEM — O10.912 PRE-EXISTING HYPERTENSION DURING PREGNANCY IN SECOND TRIMESTER: Status: ACTIVE | Noted: 2021-03-11

## 2021-05-10 PROBLEM — O99.212 OBESITY AFFECTING PREGNANCY IN SECOND TRIMESTER: Status: ACTIVE | Noted: 2021-03-11

## 2021-05-10 PROBLEM — O09.92 HIGH-RISK PREGNANCY IN SECOND TRIMESTER: Status: ACTIVE | Noted: 2021-03-11

## 2021-05-10 PROBLEM — Z3A.20 20 WEEKS GESTATION OF PREGNANCY: Status: RESOLVED | Noted: 2021-05-10 | Resolved: 2021-05-10

## 2021-05-10 PROBLEM — Z3A.20 20 WEEKS GESTATION OF PREGNANCY: Status: ACTIVE | Noted: 2021-05-10

## 2021-05-10 PROBLEM — O99.342 ANXIETY DURING PREGNANCY IN SECOND TRIMESTER, ANTEPARTUM: Status: ACTIVE | Noted: 2019-05-17

## 2021-05-11 PROBLEM — Z3A.20 20 WEEKS GESTATION OF PREGNANCY: Status: RESOLVED | Noted: 2021-05-10 | Resolved: 2021-05-11

## 2021-05-11 PROBLEM — O26.872 CERVICAL SHORTENING AFFECTING PREGNANCY IN SECOND TRIMESTER: Status: ACTIVE | Noted: 2021-05-11

## 2021-05-11 PROBLEM — Z3A.20 20 WEEKS GESTATION OF PREGNANCY: Status: ACTIVE | Noted: 2021-05-11

## 2021-05-11 PROBLEM — O99.342 ANXIETY DURING PREGNANCY IN SECOND TRIMESTER, ANTEPARTUM: Status: RESOLVED | Noted: 2019-05-17 | Resolved: 2021-05-11

## 2021-05-11 PROBLEM — F41.9 ANXIETY DURING PREGNANCY IN SECOND TRIMESTER, ANTEPARTUM: Status: RESOLVED | Noted: 2019-05-17 | Resolved: 2021-05-11

## 2021-05-11 PROBLEM — O99.012 ANEMIA AFFECTING PREGNANCY IN SECOND TRIMESTER: Status: RESOLVED | Noted: 2021-03-03 | Resolved: 2021-05-11

## 2021-05-14 ENCOUNTER — ANESTHESIA EVENT (OUTPATIENT)
Dept: LABOR AND DELIVERY | Age: 26
End: 2021-05-14
Payer: COMMERCIAL

## 2021-05-15 ENCOUNTER — ANESTHESIA (OUTPATIENT)
Dept: LABOR AND DELIVERY | Age: 26
End: 2021-05-15
Payer: COMMERCIAL

## 2021-05-15 ENCOUNTER — HOSPITAL ENCOUNTER (OUTPATIENT)
Age: 26
Setting detail: OBSERVATION
Discharge: HOME | End: 2021-05-15
Attending: OBSTETRICS & GYNECOLOGY | Admitting: OBSTETRICS & GYNECOLOGY
Payer: COMMERCIAL

## 2021-05-15 VITALS
SYSTOLIC BLOOD PRESSURE: 142 MMHG | DIASTOLIC BLOOD PRESSURE: 87 MMHG | RESPIRATION RATE: 16 BRPM | OXYGEN SATURATION: 100 % | HEART RATE: 78 BPM | TEMPERATURE: 97.8 F

## 2021-05-15 DIAGNOSIS — O34.32 CERVICAL INSUFFICIENCY DURING PREGNANCY IN SECOND TRIMESTER, ANTEPARTUM: ICD-10-CM

## 2021-05-15 LAB
ERYTHROCYTE [DISTWIDTH] IN BLOOD BY AUTOMATED COUNT: 15.1 % (ref 11.9–14.6)
HCT VFR BLD AUTO: 36.3 % (ref 35.8–46.3)
HGB BLD-MCNC: 12.3 G/DL (ref 11.7–15.4)
MCH RBC QN AUTO: 31.3 PG (ref 26.1–32.9)
MCHC RBC AUTO-ENTMCNC: 33.9 G/DL (ref 31.4–35)
MCV RBC AUTO: 92.4 FL (ref 79.6–97.8)
NRBC # BLD: 0 K/UL (ref 0–0.2)
PLATELET # BLD AUTO: 234 K/UL (ref 150–450)
PMV BLD AUTO: 10.4 FL (ref 9.4–12.3)
RBC # BLD AUTO: 3.93 M/UL (ref 4.05–5.2)
WBC # BLD AUTO: 12.3 K/UL (ref 4.3–11.1)

## 2021-05-15 PROCEDURE — 76060000032 HC ANESTHESIA 0.5 TO 1 HR: Performed by: OBSTETRICS & GYNECOLOGY

## 2021-05-15 PROCEDURE — 76010000389 HC LDRP PROCEDURE 0.5 TO 1 HR: Performed by: OBSTETRICS & GYNECOLOGY

## 2021-05-15 PROCEDURE — 74011000250 HC RX REV CODE- 250: Performed by: NURSE ANESTHETIST, CERTIFIED REGISTERED

## 2021-05-15 PROCEDURE — 74011000250 HC RX REV CODE- 250: Performed by: OBSTETRICS & GYNECOLOGY

## 2021-05-15 PROCEDURE — 74011250636 HC RX REV CODE- 250/636: Performed by: NURSE ANESTHETIST, CERTIFIED REGISTERED

## 2021-05-15 PROCEDURE — 59320 REVISION OF CERVIX: CPT | Performed by: OBSTETRICS & GYNECOLOGY

## 2021-05-15 PROCEDURE — 77030003665 HC NDL SPN BBMI -A: Performed by: NURSE ANESTHETIST, CERTIFIED REGISTERED

## 2021-05-15 PROCEDURE — 76210000064 HC RECOV POST SURG EA 0.5 HR: Performed by: OBSTETRICS & GYNECOLOGY

## 2021-05-15 PROCEDURE — 74011250637 HC RX REV CODE- 250/637: Performed by: OBSTETRICS & GYNECOLOGY

## 2021-05-15 PROCEDURE — 74011250636 HC RX REV CODE- 250/636: Performed by: OBSTETRICS & GYNECOLOGY

## 2021-05-15 PROCEDURE — 74011250636 HC RX REV CODE- 250/636: Performed by: ANESTHESIOLOGY

## 2021-05-15 PROCEDURE — 77030002986 HC SUT PROL J&J -A: Performed by: OBSTETRICS & GYNECOLOGY

## 2021-05-15 PROCEDURE — 85027 COMPLETE CBC AUTOMATED: CPT

## 2021-05-15 PROCEDURE — 77030007880 HC KT SPN EPDRL BBMI -B: Performed by: NURSE ANESTHETIST, CERTIFIED REGISTERED

## 2021-05-15 PROCEDURE — 2709999900 HC NON-CHARGEABLE SUPPLY: Performed by: OBSTETRICS & GYNECOLOGY

## 2021-05-15 PROCEDURE — 99218 HC RM OBSERVATION: CPT

## 2021-05-15 RX ORDER — HYDROCODONE BITARTRATE AND ACETAMINOPHEN 5; 325 MG/1; MG/1
2 TABLET ORAL
Status: DISCONTINUED | OUTPATIENT
Start: 2021-05-15 | End: 2021-05-15 | Stop reason: HOSPADM

## 2021-05-15 RX ORDER — INDOMETHACIN 50 MG/1
50 CAPSULE ORAL EVERY 6 HOURS
Status: DISCONTINUED | OUTPATIENT
Start: 2021-05-15 | End: 2021-05-15 | Stop reason: HOSPADM

## 2021-05-15 RX ORDER — PROPOFOL 10 MG/ML
INJECTION, EMULSION INTRAVENOUS AS NEEDED
Status: DISCONTINUED | OUTPATIENT
Start: 2021-05-15 | End: 2021-05-15 | Stop reason: HOSPADM

## 2021-05-15 RX ORDER — ONDANSETRON 2 MG/ML
4 INJECTION INTRAMUSCULAR; INTRAVENOUS ONCE
Status: DISCONTINUED | OUTPATIENT
Start: 2021-05-15 | End: 2021-05-15 | Stop reason: HOSPADM

## 2021-05-15 RX ORDER — POLYETHYLENE GLYCOL 3350 17 G/17G
17 POWDER, FOR SOLUTION ORAL
Status: DISCONTINUED | OUTPATIENT
Start: 2021-05-15 | End: 2021-05-15 | Stop reason: HOSPADM

## 2021-05-15 RX ORDER — TRISODIUM CITRATE DIHYDRATE AND CITRIC ACID MONOHYDRATE 500; 334 MG/5ML; MG/5ML
30 SOLUTION ORAL
Status: DISCONTINUED | OUTPATIENT
Start: 2021-05-15 | End: 2021-05-15 | Stop reason: HOSPADM

## 2021-05-15 RX ORDER — ONDANSETRON 2 MG/ML
INJECTION INTRAMUSCULAR; INTRAVENOUS AS NEEDED
Status: DISCONTINUED | OUTPATIENT
Start: 2021-05-15 | End: 2021-05-15 | Stop reason: HOSPADM

## 2021-05-15 RX ORDER — SODIUM CHLORIDE, SODIUM LACTATE, POTASSIUM CHLORIDE, CALCIUM CHLORIDE 600; 310; 30; 20 MG/100ML; MG/100ML; MG/100ML; MG/100ML
INJECTION, SOLUTION INTRAVENOUS
Status: DISCONTINUED | OUTPATIENT
Start: 2021-05-15 | End: 2021-05-15 | Stop reason: HOSPADM

## 2021-05-15 RX ORDER — SODIUM CHLORIDE 0.9 % (FLUSH) 0.9 %
5-40 SYRINGE (ML) INJECTION EVERY 8 HOURS
Status: DISCONTINUED | OUTPATIENT
Start: 2021-05-15 | End: 2021-05-15 | Stop reason: HOSPADM

## 2021-05-15 RX ORDER — FAMOTIDINE 20 MG/1
20 TABLET, FILM COATED ORAL EVERY 12 HOURS
Status: DISCONTINUED | OUTPATIENT
Start: 2021-05-15 | End: 2021-05-15 | Stop reason: HOSPADM

## 2021-05-15 RX ORDER — MIDAZOLAM HYDROCHLORIDE 1 MG/ML
INJECTION, SOLUTION INTRAMUSCULAR; INTRAVENOUS AS NEEDED
Status: DISCONTINUED | OUTPATIENT
Start: 2021-05-15 | End: 2021-05-15 | Stop reason: HOSPADM

## 2021-05-15 RX ORDER — SODIUM CHLORIDE, SODIUM LACTATE, POTASSIUM CHLORIDE, CALCIUM CHLORIDE 600; 310; 30; 20 MG/100ML; MG/100ML; MG/100ML; MG/100ML
125 INJECTION, SOLUTION INTRAVENOUS CONTINUOUS
Status: DISCONTINUED | OUTPATIENT
Start: 2021-05-15 | End: 2021-05-15 | Stop reason: HOSPADM

## 2021-05-15 RX ORDER — ONDANSETRON 2 MG/ML
8 INJECTION INTRAMUSCULAR; INTRAVENOUS
Status: DISCONTINUED | OUTPATIENT
Start: 2021-05-15 | End: 2021-05-15 | Stop reason: HOSPADM

## 2021-05-15 RX ORDER — CHLOROPROCAINE HYDROCHLORIDE 30 MG/ML
INJECTION, SOLUTION EPIDURAL; INFILTRATION; INTRACAUDAL; PERINEURAL AS NEEDED
Status: DISCONTINUED | OUTPATIENT
Start: 2021-05-15 | End: 2021-05-15 | Stop reason: HOSPADM

## 2021-05-15 RX ORDER — SODIUM CHLORIDE 0.9 % (FLUSH) 0.9 %
5-40 SYRINGE (ML) INJECTION AS NEEDED
Status: DISCONTINUED | OUTPATIENT
Start: 2021-05-15 | End: 2021-05-15 | Stop reason: HOSPADM

## 2021-05-15 RX ORDER — LABETALOL 200 MG/1
200 TABLET, FILM COATED ORAL EVERY 12 HOURS
Status: DISCONTINUED | OUTPATIENT
Start: 2021-05-15 | End: 2021-05-15 | Stop reason: HOSPADM

## 2021-05-15 RX ADMIN — PROPOFOL 20 MG: 10 INJECTION, EMULSION INTRAVENOUS at 08:27

## 2021-05-15 RX ADMIN — SODIUM CHLORIDE, SODIUM LACTATE, POTASSIUM CHLORIDE, AND CALCIUM CHLORIDE: 600; 310; 30; 20 INJECTION, SOLUTION INTRAVENOUS at 07:52

## 2021-05-15 RX ADMIN — CEFAZOLIN SODIUM 3 G: 100 INJECTION, POWDER, LYOPHILIZED, FOR SOLUTION INTRAVENOUS at 06:49

## 2021-05-15 RX ADMIN — ONDANSETRON 4 MG: 2 INJECTION INTRAMUSCULAR; INTRAVENOUS at 07:57

## 2021-05-15 RX ADMIN — MIDAZOLAM 2 MG: 1 INJECTION INTRAMUSCULAR; INTRAVENOUS at 08:13

## 2021-05-15 RX ADMIN — INDOMETHACIN 50 MG: 50 CAPSULE ORAL at 12:00

## 2021-05-15 RX ADMIN — SODIUM CHLORIDE, SODIUM LACTATE, POTASSIUM CHLORIDE, AND CALCIUM CHLORIDE 1000 ML: 600; 310; 30; 20 INJECTION, SOLUTION INTRAVENOUS at 06:50

## 2021-05-15 RX ADMIN — CHLOROPROCAINE HYDROCHLORIDE 50 MG: 30 INJECTION, SOLUTION EPIDURAL; INFILTRATION; INTRACAUDAL; PERINEURAL at 08:06

## 2021-05-15 RX ADMIN — PHENYLEPHRINE HYDROCHLORIDE 50 MCG: 10 INJECTION INTRAVENOUS at 08:08

## 2021-05-15 NOTE — DISCHARGE INSTRUCTIONS
Patient Education        Cervical Cerclage to Prevent  Delivery: What to Expect at Home  Your Recovery  Cervical cerclage (say \"SER-vuh-kul ser-KLAZH\") is a procedure that helps keep your cervix from opening too soon. Your doctor has sewn your cervix shut to help prevent  labor. For the next few days, you may have:  · Cramping. · Spotting. · Pain when you urinate. Your doctor may give you instructions on when you can do your normal activities again, such as driving and going back to work. Your doctor will remove the stitches around your cervix at 37 weeks, or if you go into  labor or show signs of infection. This care sheet gives you a general idea about how long it will take for you to recover. But each person recovers at a different pace. Follow the steps below to get better as quickly as possible. How can you care for yourself at home? Activity    · Rest when you feel tired.     · Ask your doctor when it is okay for you to have sex. Medicines    · Be safe with medicines. Read and follow all instructions on the label.     · If you are not taking a prescription pain medicine, ask your doctor if you can take an over-the-counter medicine.     · Your doctor will tell you if and when you can restart your medicines. He or she will also give you instructions about taking any new medicines. Follow-up care is a key part of your treatment and safety. Be sure to make and go to all appointments, and call your doctor if you are having problems. It's also a good idea to know your test results and keep a list of the medicines you take. When should you call for help? Call 911 anytime you think you may need emergency care. For example, call if:    · You have severe trouble breathing.     · You have sudden, severe pain in your belly.     · You have severe vaginal bleeding.    Call your doctor now or seek immediate medical care if:    · You have new pelvic pain, or the pain in your pelvis gets worse.     · You have a new discharge from your vagina.     · You have a new or higher fever. Watch closely for changes in your health, and be sure to contact your doctor if you have any problems. Where can you learn more? Go to http://www.gray.com/  Enter U221 in the search box to learn more about \"Cervical Cerclage to Prevent  Delivery: What to Expect at Home. \"  Current as of: 2020               Content Version: 12.8   [employer]. Care instructions adapted under license by [employer] (which disclaims liability or warranty for this information). If you have questions about a medical condition or this instruction, always ask your healthcare professional. [place] [address] any warranty or liability for your use of this information. Patient Education        Learning About When to Call Your Doctor During Pregnancy (After 20 Weeks)  Your Care Instructions  It's common to have concerns about what might be a problem during pregnancy. Although most pregnant women don't have any serious problems, it's important to know when to call your doctor if you have certain symptoms or signs of labor. These are general suggestions. Your doctor may give you some more information about when to call. When to call your doctor (after 20 weeks)  Call 911 anytime you think you may need emergency care. For example, call if:  · You have severe vaginal bleeding. · You have sudden, severe pain in your belly. · You passed out (lost consciousness). · You have a seizure. · You see or feel the umbilical cord. · You think you are about to deliver your baby and can't make it safely to the hospital.  Call your doctor now or seek immediate medical care if:  · You have vaginal bleeding. · You have belly pain. · You have a fever. · You have symptoms of preeclampsia, such as:  ? Sudden swelling of your face, hands, or feet.   ? New vision problems (such as dimness, blurring, or seeing spots). ? A severe headache. · You have a sudden release of fluid from your vagina. (You think your water broke.)  · You think that you may be in labor. This means that you've had at least 6 contractions in an hour. · You notice that your baby has stopped moving or is moving much less than normal.  · You have symptoms of a urinary tract infection. These may include:  ? Pain or burning when you urinate. ? A frequent need to urinate without being able to pass much urine. ? Pain in the flank, which is just below the rib cage and above the waist on either side of the back. ? Blood in your urine. Watch closely for changes in your health, and be sure to contact your doctor if:  · You have vaginal discharge that smells bad. · You have skin changes, such as:  ? A rash. ? Itching. ? Yellow color to your skin. · You have other concerns about your pregnancy. If you have labor signs at 37 weeks or more  If you have signs of labor at 37 weeks or more, your doctor may tell you to call when your labor becomes more active. Symptoms of active labor include:  · Contractions that are regular. · Contractions that are less than 5 minutes apart. · Contractions that are hard to talk through. Follow-up care is a key part of your treatment and safety. Be sure to make and go to all appointments, and call your doctor if you are having problems. It's also a good idea to know your test results and keep a list of the medicines you take. Where can you learn more? Go to http://www.gray.com/  Enter N531 in the search box to learn more about \"Learning About When to Call Your Doctor During Pregnancy (After 20 Weeks). \"  Current as of: October 8, 2020               Content Version: 12.8  © 0019-7297 [employer]. Care instructions adapted under license by [employer] (which disclaims liability or warranty for this information).  If you have questions about a medical condition or this instruction, always ask your healthcare professional. [place] [address] any warranty or liability for your use of this information.

## 2021-05-15 NOTE — H&P
Interval H&P       Discussion of interval history and symptoms. No changes from previously completed H&P. All questions answered. Risks, benefits, and alternatives discussed. Patient and support desire ultrasound indicated cerclage. Will proceed at this time.      Plan dc home this pm.       Sarah Samson MD

## 2021-05-15 NOTE — PROGRESS NOTES
Pt has urinated x 2 and has been oob to brp x 3. Iv is out and she recived a total of 1250 cc of Lr. Pt to be wheel chaired to family car. Pt is stable at time of discharge.

## 2021-05-15 NOTE — PROGRESS NOTES
MFM      Pt doing well post-op. Tolerated procedure well. Will plan dc home this pm once meeting milestones. Pt to continue previously prescribed indocin course and vaginal progesterone. MFM appt scheduled.        Eduardo Noriega MD

## 2021-05-15 NOTE — ANESTHESIA PROCEDURE NOTES
Spinal Block    Start time: 5/15/2021 7:59 AM  End time: 5/15/2021 8:06 AM  Performed by: Morena Cowart MD  Authorized by: Morena Cowart MD     Pre-procedure:   Indications: primary anesthetic  Preanesthetic Checklist: patient identified, risks and benefits discussed, anesthesia consent, site marked, patient being monitored and timeout performed    Timeout Time: 07:55          Spinal Block:   Patient Position:  Seated  Prep Region:  Lumbar  Prep: Betadine and patient draped      Location:  L3-4  Technique:  Single shot        Needle:   Needle Type:  Pencan  Needle Gauge:  25 G  Attempts:  1      Events: CSF confirmed, no blood with aspiration and no paresthesia        Assessment:  Insertion:  Uncomplicated  Patient tolerance:  Patient tolerated the procedure well with no immediate complications  3% PF Chloroprocaine 50mg

## 2021-05-15 NOTE — OP NOTES
Cornelius Cervical Cerclage Operative Note    Pre-operative Diagnosis: Cervical Incompetence, maternal obesity  Post-operative Diagnosis: Same as Above    Surgeon:  Misael Chatterjee MD   Assistant: Elan Beckwith MD    Anesthesia: Spinal    Procedure: Cornelius Cerclage    Indications:  Peri Medrano presents with a clinical history consistent with cervical incompetence. Cervical cerclage was offered for treatment of cervical incompetence and the risks were explained in detail in the office and again in the hospital prior to surgery. These included risk of infection, bleeding, bladder and bowel damage and pregnancy loss, along with rupture of membranes now or later in pregnancy. These complications were all explained in detail and questions answered. It was explained to the patient that she had the option of expectant management without cerclage placement. She understood that her care and treatment would not be affected by her choice and there was no pressure on her to choose this course of treatment. After a long discussion and clear understanding by the patient, the patient consented to the procedure prior to coming to the hospital and, again on admission to the hospital.     Procedure Details:   Peri Medrano was taken to the Operating Room where spinal anesthetic was administered. The patient was then placed in the dorsal lithotomy position. The vulva and distal vagina were then gently prepped with Betadine solution and draped in a sterile fashion to expose the vaginal introitus. The patient was then placed in Trendelenburg position to allow better visualization of the vaginal canal and the cervix. An I and O  catheter was used to drain 25ml clear urine from the bladder. Using retractors, the cervix and distal vagina were visualized. The vaginal portion of the cervical length was severely shortened. The external cervical os appeared to be open and the internal os was dilated 1 cm.  Fetal membranes were not visualized. The cervix and distal vagina were again gently washed carefully with Betadine solution and dried with sterile sponges. A long atraumatic Allis clamp was used to retract the cervix by grasping a significant portio of the cervix, carefully placed to avoid membranes at the 10 o'clock position. Using 5mm Mersaline, the first bite of the Cornelius stitch was placed at the 12 o'clock position on the cervix at the junction of the vaginal mucosa and the portio of the cervix with the suture placed through the cervical stroma, careful to avoid cervical canal and amniotic sac, between mucosa and cervical canal.  Shallow bites placed in a purse string fashion with sequential grasping and releasing of the cervix with the Allis clamp to retract the cervical stroma and allow for placement of suture at the junction of the vaginal mucosa and the portio of the cervix. Care was used in grasping the cervix to be atraumatic and to cause as little of trauma and bleeding as possible. The last stitch ended at approximately the 12 o'clock again, needle cut and both sutures were gently retracted to take up any slack in the suture and a finger placed in the cervix and retraction of both ends of suture and internal os noted to be closed with suture tension. The suture was evaluated visually in all quadrants and felt to be at the junction of the vaginal mucosa and the portio of the cervix without including any sidewall and without including bladder or bowel. The suture was then tied with 5 square knots. The cervix was checked again and visualized and the cervix remained pink with no significantly bleeding, internal os palpated and was tightly closed. A 2-0 silk suture was place through both ends of suture aproximately 1 cm distal to knots and then tied. Cervix and vagina were again evaluated and no bleeding was noted. The cervix remained pink. Instruments and retractors were removed.  Rectal exam was performed and no sutures were noted in the rectum. At the end of the procedure, sponge, instrument and needle counts were noted to be correct. Estimated Blood Loss:  75ml    Suture Type: 5mm Mersaline    Number of Sutures: 1    Findings:  Significantly difficult ultrasound indicated cerclage due to maternal habitus. This increased the technical difficulty as well as required assistance of second surgeon. Anterior cystocele gr 1 noted. Pt tolerated the procedure well.        Signed By: Chris Smith MD 5/15/2021

## 2021-05-15 NOTE — PROGRESS NOTES
Pt admitted to room 422. IV started, labs collected and sent. Consents signed. POC reviewed. Bed low and locked, call light within reach. Best friend at bedside. No needs/concerns at this time.

## 2021-05-15 NOTE — ANESTHESIA PREPROCEDURE EVALUATION
Relevant Problems   No relevant active problems       Anesthetic History   No history of anesthetic complications            Review of Systems / Medical History  Patient summary reviewed, nursing notes reviewed and pertinent labs reviewed    Pulmonary  Within defined limits                 Neuro/Psych         Psychiatric history (anxiety)     Cardiovascular    Hypertension              Exercise tolerance: >4 METS     GI/Hepatic/Renal                Endo/Other        Morbid obesity    Comments: PCOS Other Findings              Physical Exam    Airway  Mallampati: II    Neck ROM: normal range of motion   Mouth opening: Normal     Cardiovascular  Regular rate and rhythm,  S1 and S2 normal,  no murmur, click, rub, or gallop             Dental  No notable dental hx       Pulmonary  Breath sounds clear to auscultation               Abdominal         Other Findings            Anesthetic Plan    ASA: 3  Anesthesia type: spinal            Anesthetic plan and risks discussed with: Patient and Family

## 2021-05-16 NOTE — ANESTHESIA POSTPROCEDURE EVALUATION
Procedure(s):  CERCLAGE.    spinal    Anesthesia Post Evaluation        Patient location during evaluation: bedside  Patient participation: complete - patient participated  Level of consciousness: awake and alert  Pain management: adequate  Airway patency: patent  Anesthetic complications: no  Cardiovascular status: acceptable  Respiratory status: acceptable  Hydration status: acceptable  Post anesthesia nausea and vomiting:  none      INITIAL Post-op Vital signs:   Vitals Value Taken Time   /87 05/15/21 1302   Temp 36.6 °C (97.8 °F) 05/15/21 1302   Pulse 78 05/15/21 1302   Resp 16 05/15/21 1302   SpO2 100 % 05/15/21 0941

## 2021-05-21 ENCOUNTER — HOSPITAL ENCOUNTER (OUTPATIENT)
Age: 26
Discharge: HOME | End: 2021-05-21
Attending: OBSTETRICS & GYNECOLOGY | Admitting: OBSTETRICS & GYNECOLOGY
Payer: COMMERCIAL

## 2021-05-21 VITALS
HEIGHT: 68 IN | WEIGHT: 293 LBS | BODY MASS INDEX: 44.41 KG/M2 | TEMPERATURE: 98.4 F | SYSTOLIC BLOOD PRESSURE: 139 MMHG | DIASTOLIC BLOOD PRESSURE: 84 MMHG | HEART RATE: 82 BPM

## 2021-05-21 PROBLEM — T19.2XXA VAGINAL FOREIGN BODY: Status: ACTIVE | Noted: 2021-05-21

## 2021-05-21 PROCEDURE — 99282 EMERGENCY DEPT VISIT SF MDM: CPT

## 2021-05-21 PROCEDURE — 76815 OB US LIMITED FETUS(S): CPT

## 2021-05-22 NOTE — DISCHARGE INSTRUCTIONS
Please follow up with primary OB doctor at next apt. Return to [facility] if experiencing contractions that become stronger and closer together, leaking of fluid, vaginal bleeding, or decreased fetal movement. Pregnancy Precautions: Care Instructions  Your Care Instructions     There is no sure way to prevent labor before your due date ( labor) or to prevent most other pregnancy problems. But there are things you can do to increase your chances of a healthy pregnancy. Go to your appointments, follow your doctor's advice, and take good care of yourself. Eat well, and exercise (if your doctor agrees). And make sure to drink plenty of water. Follow-up care is a key part of your treatment and safety. Be sure to make and go to all appointments, and call your doctor if you are having problems. It's also a good idea to know your test results and keep a list of the medicines you take. How can you care for yourself at home? · Make sure you go to your prenatal appointments. At each visit, your doctor will check your blood pressure. Your doctor will also check to see if you have protein in your urine. High blood pressure and protein in urine are signs of preeclampsia. This condition can be dangerous for you and your baby. · Drink plenty of fluids. Dehydration can cause contractions. If you have kidney, heart, or liver disease and have to limit fluids, talk with your doctor before you increase the amount of fluids you drink. · Tell your doctor right away if you notice any symptoms of an infection, such as:  ? Burning when you urinate. ? A foul-smelling discharge from your vagina. ? Vaginal itching. ? Unexplained fever. ? Unusual pain or soreness in your uterus or lower belly. · Eat a balanced diet. Include plenty of foods that are high in calcium and iron. ? Foods high in calcium include milk, cheese, yogurt, almonds, and broccoli. ?  Foods high in iron include red meat, shellfish, poultry, eggs, beans, raisins, whole-grain bread, and leafy green vegetables. · Do not smoke. If you need help quitting, talk to your doctor about stop-smoking programs and medicines. These can increase your chances of quitting for good. · Do not drink alcohol or use marijuana or illegal drugs. · Follow your doctor's directions about activity. Your doctor will let you know how much, if any, exercise you can do. · Ask your doctor if you can have sex. If you are at risk for early labor, your doctor may ask you to not have sex. · Take care to prevent falls. During pregnancy, your joints are loose, and your balance is off. Sports such as bicycling, skiing, or in-line skating can increase your risk of falling. And don't ride horses or motorcycles, dive, water ski, scuba dive, or parachute jump while you are pregnant. · Avoid getting very hot. Do not use saunas or hot tubs. Avoid staying out in the sun in hot weather for long periods. Take acetaminophen (Tylenol) to lower a high fever. · Do not take any over-the-counter or herbal medicines or supplements without talking to your doctor or pharmacist first.  When should you call for help? Call 911 anytime you think you may need emergency care. For example, call if:    · You passed out (lost consciousness).     · You have a seizure.     · You have severe vaginal bleeding.     · You have severe pain in your belly or pelvis.     · You have had fluid gushing or leaking from your vagina and you know or think the umbilical cord is bulging into your vagina. If this happens, immediately get down on your knees so your rear end (buttocks) is higher than your head. This will decrease the pressure on the cord until help arrives. Call your doctor now or seek immediate medical care if:    · You have signs of preeclampsia, such as:  ? Sudden swelling of your face, hands, or feet. ? New vision problems (such as dimness, blurring, or seeing spots). ? A severe headache.     · You have any vaginal bleeding.   · You have belly pain or cramping.     · You have a fever.     · You have had regular contractions (with or without pain) for an hour. This means that you have 8 or more within 1 hour or 4 or more in 20 minutes after you change your position and drink fluids.     · You have a sudden release of fluid from your vagina.     · You have low back pain or pelvic pressure that does not go away.     · You notice that your baby has stopped moving or is moving much less than normal.   Watch closely for changes in your health, and be sure to contact your doctor if you have any problems. Where can you learn more? Go to http://www.ruther[employer].com/  Enter Y951 in the search box to learn more about \"Pregnancy Precautions: Care Instructions. \"  Current as of: October 8, 2020               Content Version: 12.8  © 9361-2642 Healthwise, Incorporated. Care instructions adapted under license by [employer] (which disclaims liability or warranty for this information). If you have questions about a medical condition or this instruction, always ask your healthcare professional. [place] [address] any warranty or liability for your use of this information.

## 2021-05-22 NOTE — H&P
Chief Complaint: possible foreign body in the vagina      22 y.o. female  at 22w0d  weeks gestation who is seen for possible foreign body in the vagina. Pt has a cerclage placed 6 days ago for short CL on ultrasound. Pt denies VB, LOF, uterine ctx, abdominal pain, UTI, PEC, or C-19 symptoms. Pt is on labetalol for CHTN. HISTORY:    Social History     Substance and Sexual Activity   Sexual Activity Yes    Partners: Male    Birth control/protection: None     Patient's last menstrual period was 12/10/2020 (approximate). Social History     Socioeconomic History    Marital status: SINGLE     Spouse name: Not on file    Number of children: Not on file    Years of education: Not on file    Highest education level: Not on file   Occupational History    Not on file   Tobacco Use    Smoking status: Never Smoker    Smokeless tobacco: Never Used   Vaping Use    Vaping Use: Never used   Substance and Sexual Activity    Alcohol use: Not Currently    Drug use: Never    Sexual activity: Yes     Partners: Male     Birth control/protection: None   Other Topics Concern     Service Not Asked    Blood Transfusions Not Asked    Caffeine Concern Not Asked    Occupational Exposure Not Asked    Hobby Hazards Not Asked    Sleep Concern Not Asked    Stress Concern Not Asked    Weight Concern Not Asked    Special Diet Not Asked    Back Care Not Asked    Exercise Not Asked    Bike Helmet Not Asked    [address] Not Asked    Self-Exams Not Asked   Social History Narrative    Not on file     Social Determinants of Health     Financial Resource Strain:     Difficulty of Paying Living Expenses:    Food Insecurity:     Worried About Running Out of Food in the Last Year:     Ran Out of Food in the Last Year:    Transportation Needs:     Lack of Transportation (Medical):      Lack of Transportation (Non-Medical):    Physical Activity:     Days of Exercise per Week:     Minutes of Exercise per Session:    Stress:     Feeling of Stress :    Social Connections:     Frequency of Communication with Friends and Family:     Frequency of Social Gatherings with Friends and Family:     Attends [employer] Services:     Active Member of Clubs or Organizations:     Attends Club or Organization Meetings:     Marital Status:    Intimate Partner Violence:     Fear of Current or Ex-Partner:     Emotionally Abused:     Physically Abused:     Sexually Abused:        Past Surgical History:   Procedure Laterality Date    HX BREAST REDUCTION Bilateral 11/3/2020    BREAST REDUCTION BILATERAL performed by Wu Up MD at [facility]      breast reduction       Past Medical History:   Diagnosis Date    Anemia affecting pregnancy in second trimester 8/9/0750    Anemia complicating pregnancy 8/1/7614    Anxiety during pregnancy in second trimester, antepartum 5/17/2019    Mainly used for testing. She will need a letter written for school stating why the use of Propranolol. Letter given to patient today in office.  Hypertension     Morbid obesity ([facility] [address]     PCOS (polycystic ovarian syndrome)          ROS:  A 12 point review of symptoms negative except for chief complaint as described above. PHYSICAL EXAM:  Blood pressure (!) 149/84, pulse 81, height 5' 8\" (1.727 m), weight 138.8 kg (306 lb), last menstrual period 12/10/2020. Repeat BP is 139/84    Constitutional: The patient appears well, alert, oriented x 3. Cardiovascular: Heart RRR, no murmurs.    Respiratory: Lungs clear, no respiratory distress  GI: Abdomen soft, nontender, no guarding  No fundal tenderness  Musculoskeletal: no cva tenderness  Lower ext: no edema, neg guzman's, reflexes +2  Skin: no rashes or lesions  Psychiatric:Mood/ Affect: appropriate  Genitourinary: SVE: long and closed; cerclage palpate; no other foreign body in the vagina  FHT: 140/minute  TOCO: no ctx  Abd ultrasound: active fetus; AFV appear normal  Vaginal ultrasound: CL - 4.3cm; no funneling    I personally reviewed pt's medical record including relevant labs and ultrasounds  I reviewed the NST at today's encounter    Assessment/Plan:  Pt presents at 22w0d with concern for foreign body in the vagina. On exam there is a cerclage but no other foreign body. Pt is discharged to home with PTL precautions. Pt to f/u with her PObP.

## 2021-05-22 NOTE — PROGRESS NOTES
Pt arrived to [facility] with complaint of something in her vagina. She had a cerclage last Saturday, and while she was placing her progesterone pills as prescribed tonight, she felt something in her vagina that is not usually there. Thinks it feel like metal but is not sure. Pt states she has had some spotting since her cerclage but denies SROM.  via US. Pt is a has CHT and states she just took her labetalol about 15 min.

## 2021-05-22 NOTE — PROGRESS NOTES
Discharge orders received from MD. Discharge instructions given to pt and pt verbalized understanding. Pt d/c home.

## 2021-05-26 PROBLEM — R00.1 BRADYCARDIA: Status: RESOLVED | Noted: 2020-04-14 | Resolved: 2021-05-26

## 2021-05-26 PROBLEM — Z23 NEED FOR HEPATITIS B VACCINATION: Status: RESOLVED | Noted: 2020-03-10 | Resolved: 2021-05-26

## 2021-05-26 PROBLEM — F41.9 ANXIETY: Status: ACTIVE | Noted: 2019-05-17

## 2021-05-26 PROBLEM — Z23 NEED FOR INFLUENZA VACCINATION: Status: RESOLVED | Noted: 2019-09-20 | Resolved: 2021-05-26

## 2021-05-26 PROBLEM — N62 LARGE BREASTS: Status: ACTIVE | Noted: 2020-03-10

## 2021-05-26 PROBLEM — O34.32 CERVICAL INSUFFICIENCY DURING PREGNANCY IN SECOND TRIMESTER, ANTEPARTUM: Status: RESOLVED | Noted: 2021-05-15 | Resolved: 2021-05-26

## 2021-05-26 PROBLEM — R00.1 BRADYCARDIA: Status: ACTIVE | Noted: 2020-04-14

## 2021-05-26 PROBLEM — T19.2XXA VAGINAL FOREIGN BODY: Status: RESOLVED | Noted: 2021-05-21 | Resolved: 2021-05-26

## 2021-05-26 PROBLEM — H92.01 RIGHT EAR PAIN: Status: ACTIVE | Noted: 2020-01-13

## 2021-05-26 PROBLEM — E66.3 OVERWEIGHT: Status: RESOLVED | Noted: 2020-01-13 | Resolved: 2021-05-26

## 2021-05-26 PROBLEM — Z23 NEED FOR HEPATITIS B VACCINATION: Status: ACTIVE | Noted: 2020-03-10

## 2021-05-26 PROBLEM — N62 LARGE BREASTS: Status: RESOLVED | Noted: 2020-03-10 | Resolved: 2021-05-26

## 2021-05-26 PROBLEM — I10 ESSENTIAL HYPERTENSION: Status: ACTIVE | Noted: 2019-05-17

## 2021-05-26 PROBLEM — H92.01 RIGHT EAR PAIN: Status: RESOLVED | Noted: 2020-01-13 | Resolved: 2021-05-26

## 2021-05-26 PROBLEM — Z23 NEED FOR INFLUENZA VACCINATION: Status: ACTIVE | Noted: 2019-09-20

## 2021-05-26 PROBLEM — K59.00 CONSTIPATION DURING PREGNANCY IN SECOND TRIMESTER: Status: ACTIVE | Noted: 2021-05-26

## 2021-05-26 PROBLEM — E66.3 OVERWEIGHT: Status: ACTIVE | Noted: 2020-01-13

## 2021-05-26 PROBLEM — I10 ESSENTIAL HYPERTENSION: Status: RESOLVED | Noted: 2019-05-17 | Resolved: 2021-05-26

## 2021-05-26 PROBLEM — O99.612 CONSTIPATION DURING PREGNANCY IN SECOND TRIMESTER: Status: ACTIVE | Noted: 2021-05-26

## 2021-06-17 PROBLEM — O99.340 ANXIETY DURING PREGNANCY: Status: ACTIVE | Noted: 2019-05-17

## 2021-06-18 PROBLEM — K59.00 CONSTIPATION DURING PREGNANCY IN SECOND TRIMESTER: Status: RESOLVED | Noted: 2021-05-26 | Resolved: 2021-06-18

## 2021-06-18 PROBLEM — O99.612 CONSTIPATION DURING PREGNANCY IN SECOND TRIMESTER: Status: RESOLVED | Noted: 2021-05-26 | Resolved: 2021-06-18

## 2021-07-14 ENCOUNTER — HOSPITAL ENCOUNTER (OUTPATIENT)
Age: 26
Discharge: HOME | End: 2021-07-14
Attending: OBSTETRICS & GYNECOLOGY | Admitting: OBSTETRICS & GYNECOLOGY
Payer: COMMERCIAL

## 2021-07-14 VITALS
BODY MASS INDEX: 44.41 KG/M2 | HEIGHT: 68 IN | RESPIRATION RATE: 18 BRPM | WEIGHT: 293 LBS | SYSTOLIC BLOOD PRESSURE: 123 MMHG | HEART RATE: 103 BPM | DIASTOLIC BLOOD PRESSURE: 73 MMHG

## 2021-07-14 PROBLEM — O36.8130 DECREASED FETAL MOVEMENT AFFECTING MANAGEMENT OF PREGNANCY IN THIRD TRIMESTER: Status: ACTIVE | Noted: 2021-07-14

## 2021-07-14 PROCEDURE — 99282 EMERGENCY DEPT VISIT SF MDM: CPT

## 2021-07-14 NOTE — ED PROVIDER NOTES
Chief Complaint: decreased fetal movement      22 y.o. female G1 at 29w5d  weeks gestation who is seen for decreased fetal movement. Pt reports decreased movement yesterday and no movement today. Pregnancy has been complicated by chronic hypertension and shortened cervix with cerclage in place. BP has been well controlled on labetalol. Denies vag bleeding or discharge. HISTORY:    Social History     Substance and Sexual Activity   Sexual Activity Yes    Partners: Male    Birth control/protection: None     Patient's last menstrual period was 12/10/2020 (approximate). Social History     Socioeconomic History    Marital status: SINGLE     Spouse name: Not on file    Number of children: Not on file    Years of education: Not on file    Highest education level: Not on file   Occupational History    Not on file   Tobacco Use    Smoking status: Never Smoker    Smokeless tobacco: Never Used   Vaping Use    Vaping Use: Never used   Substance and Sexual Activity    Alcohol use: Not Currently    Drug use: Never    Sexual activity: Yes     Partners: Male     Birth control/protection: None   Other Topics Concern     Service Not Asked    Blood Transfusions Not Asked    Caffeine Concern Not Asked    Occupational Exposure Not Asked    Hobby Hazards Not Asked    Sleep Concern Not Asked    Stress Concern Not Asked    Weight Concern Not Asked    Special Diet Not Asked    Back Care Not Asked    Exercise Not Asked    Bike Helmet Not Asked   [address] [facility] Not Asked    Self-Exams Not Asked   Social History Narrative    Not on file     Social Determinants of Health     Financial Resource Strain:     Difficulty of Paying Living Expenses:    Food Insecurity:     Worried About Running Out of Food in the Last Year:     Ran Out of Food in the Last Year:    Transportation Needs:     Lack of Transportation (Medical):      Lack of Transportation (Non-Medical):    Physical Activity:     Days of Exercise per Week:     Minutes of Exercise per Session:    Stress:     Feeling of Stress :    Social Connections:     Frequency of Communication with Friends and Family:     Frequency of Social Gatherings with Friends and Family:     Attends [employer] Services:     Active Member of Clubs or Organizations:     Attends Club or Organization Meetings:     Marital Status:    Intimate Partner Violence:     Fear of Current or Ex-Partner:     Emotionally Abused:     Physically Abused:     Sexually Abused:        Past Surgical History:   Procedure Laterality Date    HX BREAST REDUCTION Bilateral 11/3/2020    BREAST REDUCTION BILATERAL performed by Sandy Boggs MD at [facility]       Past Medical History:   Diagnosis Date    Anxiety     Hypertension     Morbid obesity ([address]     PCOS (polycystic ovarian syndrome)          ROS:  A 12 point review of symptoms negative except for chief complaint as described above. PHYSICAL EXAM:  Blood pressure 123/73, pulse (!) 103, resp. rate 18, last menstrual period 12/10/2020. Constitutional: The patient appears well, alert, oriented x 3. Cardiovascular: Heart RRR, no murmurs.    Respiratory: Lungs clear, no respiratory distress  GI: Abdomen soft, nontender, no guarding  No fundal tenderness  Musculoskeletal: no cva tenderness  Upper ext: no edema, reflexes +2  Lower ext: no edema, neg guzman's, reflexes +2  Skin: no rashes or lesions  Psychiatric:Mood/ Affect: appropriate  Genitourinary: SVE:deferred  FHT: 140's mod variability; no decels  TOCO:no contractions    Bedside ultrasound- vertex, ant placenta grade 1 , sumit = 14.1, lots of movement seen    I personally reviewed pt's medical record including relevant labs and ultrasounds  I reviewed the NST at today's encounter    Assessment/Plan:  23 yo G1 at 34w10d with complaint of no fetal movement  Lots of movement seen on ultrasound  sumit = 14, ant grade 1 placenta  nst - 140's mod variability, no decels  Home with precautions to return if baby not moving well

## 2021-07-14 NOTE — DISCHARGE INSTRUCTIONS
Patient Education        Learning About When to Call Your Doctor During Pregnancy (After 20 Weeks)  Your Care Instructions  It's common to have concerns about what might be a problem during pregnancy. Although most pregnant women don't have any serious problems, it's important to know when to call your doctor if you have certain symptoms or signs of labor. These are general suggestions. Your doctor may give you some more information about when to call. When to call your doctor (after 20 weeks)  Call 911 anytime you think you may need emergency care. For example, call if:  · You have severe vaginal bleeding. · You have sudden, severe pain in your belly. · You passed out (lost consciousness). · You have a seizure. · You see or feel the umbilical cord. · You think you are about to deliver your baby and can't make it safely to the hospital.  Call your doctor now or seek immediate medical care if:  · You have vaginal bleeding. · You have belly pain. · You have a fever. · You have symptoms of preeclampsia, such as:  ? Sudden swelling of your face, hands, or feet. ? New vision problems (such as dimness, blurring, or seeing spots). ? A severe headache. · You have a sudden release of fluid from your vagina. (You think your water broke.)  · You think that you may be in labor. This means that you've had at least 6 contractions in an hour. · You notice that your baby has stopped moving or is moving much less than normal.  · You have symptoms of a urinary tract infection. These may include:  ? Pain or burning when you urinate. ? A frequent need to urinate without being able to pass much urine. ? Pain in the flank, which is just below the rib cage and above the waist on either side of the back. ? Blood in your urine. Watch closely for changes in your health, and be sure to contact your doctor if:  · You have vaginal discharge that smells bad. · You have skin changes, such as:  ? A rash. ? Itching.   ? Yellow color to your skin. · You have other concerns about your pregnancy. If you have labor signs at 37 weeks or more  If you have signs of labor at 37 weeks or more, your doctor may tell you to call when your labor becomes more active. Symptoms of active labor include:  · Contractions that are regular. · Contractions that are less than 5 minutes apart. · Contractions that are hard to talk through. Follow-up care is a key part of your treatment and safety. Be sure to make and go to all appointments, and call your doctor if you are having problems. It's also a good idea to know your test results and keep a list of the medicines you take. Where can you learn more? Go to http://www.gray.com/  Enter N531 in the search box to learn more about \"Learning About When to Call Your Doctor During Pregnancy (After 20 Weeks). \"  Current as of: October 8, 2020               Content Version: 12.8  © 1763-3702 Healthwise, Incorporated. Care instructions adapted under license by [employer] (which disclaims liability or warranty for this information). If you have questions about a medical condition or this instruction, always ask your healthcare professional. [place] [address] any warranty or liability for your use of this information.

## 2021-07-18 ENCOUNTER — HOSPITAL ENCOUNTER (OUTPATIENT)
Age: 26
Discharge: HOME | End: 2021-07-18
Attending: OBSTETRICS & GYNECOLOGY | Admitting: OBSTETRICS & GYNECOLOGY
Payer: COMMERCIAL

## 2021-07-18 VITALS
BODY MASS INDEX: 44.41 KG/M2 | TEMPERATURE: 98 F | DIASTOLIC BLOOD PRESSURE: 87 MMHG | WEIGHT: 293 LBS | SYSTOLIC BLOOD PRESSURE: 145 MMHG | HEIGHT: 68 IN | OXYGEN SATURATION: 100 % | HEART RATE: 92 BPM

## 2021-07-18 PROBLEM — N93.9 VAGINAL SPOTTING: Status: ACTIVE | Noted: 2021-07-18

## 2021-07-18 PROCEDURE — 59025 FETAL NON-STRESS TEST: CPT

## 2021-07-18 PROCEDURE — 99282 EMERGENCY DEPT VISIT SF MDM: CPT

## 2021-07-18 NOTE — PROGRESS NOTES
Dr. John Marin at the bedside. Sterile speculum exam of cerclage. No bleeding observed. Pt denies cramping or any leaking of fluid. Dr. John Marin reviewed strip and okay'd for patient to go home. Instructions given. No questions voiced.  dc'd home

## 2021-07-18 NOTE — DISCHARGE INSTRUCTIONS
Patient Education   Patient Education   Patient Education        Cervical Cerclage to Prevent  Delivery: What to Expect at Home  Your Recovery  Cervical cerclage (say \"SER-vuh-kul ser-KLAZH\") is a procedure that helps keep your cervix from opening too soon. Your doctor has sewn your cervix shut to help prevent  labor. For the next few days, you may have:  · Cramping. · Spotting. · Pain when you urinate. Your doctor may give you instructions on when you can do your normal activities again, such as driving and going back to work. Your doctor will remove the stitches around your cervix at 37 weeks, or if you go into  labor or show signs of infection. This care sheet gives you a general idea about how long it will take for you to recover. But each person recovers at a different pace. Follow the steps below to get better as quickly as possible. How can you care for yourself at home? Activity    · Rest when you feel tired.     · Ask your doctor when it is okay for you to have sex. Medicines    · Be safe with medicines. Read and follow all instructions on the label.     · If you are not taking a prescription pain medicine, ask your doctor if you can take an over-the-counter medicine.     · Your doctor will tell you if and when you can restart your medicines. He or she will also give you instructions about taking any new medicines. Follow-up care is a key part of your treatment and safety. Be sure to make and go to all appointments, and call your doctor if you are having problems. It's also a good idea to know your test results and keep a list of the medicines you take. When should you call for help? Call 911 anytime you think you may need emergency care. For example, call if:    · You have severe trouble breathing.     · You have sudden, severe pain in your belly.     · You have severe vaginal bleeding.    Call your doctor now or seek immediate medical care if:    · You have new pelvic pain, or the pain in your pelvis gets worse.     · You have a new discharge from your vagina.     · You have a new or higher fever. Watch closely for changes in your health, and be sure to contact your doctor if you have any problems. Where can you learn more? Go to http://www.gray.com/  Enter Q024 in the search box to learn more about \"Cervical Cerclage to Prevent  Delivery: What to Expect at Home. \"  Current as of: 2020               Content Version: 12.8  © 1436-8436 [employer]. Care instructions adapted under license by "[employer] (which disclaims liability or warranty for this information). If you have questions about a medical condition or this instruction, always ask your healthcare professional. [address] any warranty or liability for your use of this information. Learning About Fetal Ultrasound Results  What is a fetal ultrasound? Fetal ultrasound is a test that lets your doctor see an image of your baby. Your doctor learns information about your baby from this picture. You may find out, for example, if you are having a boy or a girl. But the main reason you have this test is to get information about your baby's health. (You may hear your baby called a fetus. This is a common medical term for a baby that's growing in the mother's uterus.)  What kind of information can you learn from this test?  The findings of an ultrasound fall into two categories, normal and abnormal.  Normal  · The fetus is the right size for its age. · The placenta is the expected size and does not cover the cervix. · There is enough amniotic fluid in the uterus. · No birth defects can be seen. Abnormal  · The fetus is small or large for its age. · The placenta covers the cervix. · There is too much or too little amniotic fluid in the uterus. · The fetus may have a birth defect.   What does an abnormal result mean?  Abnormal seems to imply that something is wrong with your baby. But what it means is that the test has shown something the doctor wants to take a closer look at. And that's what happens next. Your doctor will talk to you about what further test or tests you may need. What do the results mean? Some of the things your doctor may see on an abnormal ultrasound include:  Echogenic bowel. The bowel looks very bright on the screen. This could mean that there's blood in the bowel. Or it could mean that something is blocking the small bowel. Increased nuchal translucency. The ultrasound measures the thickness at the back of the baby's neck. An increase in thickness is sometimes an early sign of Down syndrome. Increased or decreased amniotic fluid. The doctor will look for a reason for the level of amniotic fluid and will watch the pregnancy closely as it progresses. Large ventricles. Ventricles in the brain look larger than they should. Your doctor may take a closer look at the brain. Renal pyelectasis/hydronephrosis. The ultrasound measures the fluid around the kidney. If there is more fluid than expected, there is a chance of urinary tract or kidney problems. Short long bones. The ultrasound measures certain arm and leg bones. A long bone (humerus or femur) that is shorter than average could be a sign of Down syndrome. Subchorionic hemorrhage. An ultrasound can show bleeding under one of the membranes that surrounds the fetus. Some women don't have symptoms of bleeding. The ultrasound can find this problem when women are not bleeding from their vagina. Women who have this condition have a slightly higher chance of miscarriage. What do you do now? Take a deep breath, and let it out. Keep in mind that an abnormal finding on an ultrasound, after it's coupled with more information, may:  · Turn out to be nothing. · Turn out to be something mild that won't affect the baby.   · Turn out to be something more serious. But if this happens, early diagnosis helps you and your doctor plan treatment options sooner rather than later. Your medical team is there for you. So are your family and friends. Ask questions, and get the help and support you need. Follow-up care is a key part of your treatment and safety. Be sure to make and go to all appointments, and call your doctor if you are having problems. It's also a good idea to know your test results and keep a list of the medicines you take. Where can you learn more? Go to http://www.gray.com/  Enter K451 in the search box to learn more about \"Learning About Fetal Ultrasound Results. \"  Current as of: 2020               Content Version: 12.8   [employer]. Care instructions adapted under license by [employer] (which disclaims liability or warranty for this information). If you have questions about a medical condition or this instruction, always ask your healthcare professional. [place] [address] any warranty or liability for your use of this information. Weeks 26 to 30 of Your Pregnancy: Care Instructions  Overview     You are now entering your last trimester of pregnancy. Your baby is growing quickly. Brittany Vaca probably feel your baby moving around more often. Your doctor may ask you to count your baby's kicks. Your back may ache as your body gets used to your baby's size and length. If you haven't already had the Tdap shot during this pregnancy, talk to your doctor about getting it. It will help protect your  against pertussis infection. During this time, it's important to take care of yourself and pay attention to what your body needs. If you feel sexual, you can explore ways to be close with your partner that match your comfort and desire. Follow-up care is a key part of your treatment and safety.  Be sure to make and go to all appointments, and call your doctor if you are having problems. It's also a good idea to know your test results and keep a list of the medicines you take. How can you care for yourself at home? Take it easy at work  · Take frequent breaks. If possible, stop working when you are tired, and rest during your lunch hour. · Take bathroom breaks every 2 hours. · Change positions often. If you sit for long periods, stand up and walk around. · When you stand for a long time, keep one foot on a low stool with your knee bent. After standing a lot, sit with your feet up. · Avoid fumes, chemicals, and tobacco smoke. Be sexual in your own way  · Having sex during pregnancy is okay, unless your doctor tells you not to. · You may be very interested in sex, or you may have no interest at all. · Your growing belly can make it hard to find a good position during intercourse. [place] and explore. · You may get cramps in your uterus when your partner touches your breasts. · A back rub may relieve the backache or cramps that sometimes follow orgasm. Learn about  labor  · Watch for signs of  labor. You may be going into labor if:  ? You have menstrual-like cramps, with or without nausea. ? You have about 6 or more contractions in 1 hour, even after you have had a glass of water and are resting. ? You have a low, dull backache that does not go away when you change your position. ? You have pain or pressure in your pelvis that comes and goes in a pattern. ? You have intestinal cramping or flu-like symptoms, with or without diarrhea.  ? You notice an increase or change in your vaginal discharge. Discharge may be heavy, mucus-like, watery, or streaked with blood. ? Your water breaks. · If you think you have  labor:  ? Drink 2 or 3 glasses of water or juice. Not drinking enough fluids can cause contractions. ? Stop what you are doing, and empty your bladder. Then lie down on your left side for at least 1 hour. ?  While lying on your side, find your breast bone. Put your fingers in the soft spot just below it. Move your fingers down toward your belly button to find the top of your uterus. Check to see if it is tight. ? Contractions can be weak or strong. Record your contractions for an hour. Time a contraction from the start of one contraction to the start of the next one.  ? Single or several strong contractions without a pattern are called [place]-Cesar contractions. They are practice contractions but not the start of labor. They often stop if you change what you are doing. ? Call your doctor if you have regular contractions. Where can you learn more? Go to http://www.gray.com/  Enter T235 in the search box to learn more about \"Weeks 26 to 30 of Your Pregnancy: Care Instructions. \"  Current as of: October 8, 2020               Content Version: 12.8  © 9983-8525 [employer]. Care instructions adapted under license by [employer] (which disclaims liability or warranty for this information). If you have questions about a medical condition or this instruction, always ask your healthcare professional. [address] any warranty or liability for your use of this information.

## 2021-07-18 NOTE — H&P
Chief Complaint: vaginal bleeding      22 y.o. female  at 30w2d  weeks gestation who is seen for an episode of VB at the time she inserted her vaginal progesterone earlier in the evening. Pt notes good FM. She denies any further VB, LOF, uterine ctx, abdominal pain, UTI, PEC, or C-19 symptoms. HISTORY:    Social History     Substance and Sexual Activity   Sexual Activity Yes    Partners: Male    Birth control/protection: None     Patient's last menstrual period was 12/10/2020 (approximate). Social History     Socioeconomic History    Marital status: SINGLE     Spouse name: Not on file    Number of children: Not on file    Years of education: Not on file    Highest education level: Not on file   Occupational History    Not on file   Tobacco Use    Smoking status: Never Smoker    Smokeless tobacco: Never Used   Vaping Use    Vaping Use: Never used   Substance and Sexual Activity    Alcohol use: Not Currently    Drug use: Never    Sexual activity: Yes     Partners: Male     Birth control/protection: None   Other Topics Concern     Service Not Asked    Blood Transfusions Not Asked    Caffeine Concern Not Asked    Occupational Exposure Not Asked    Hobby Hazards Not Asked    Sleep Concern Not Asked    Stress Concern Not Asked    Weight Concern Not Asked    Special Diet Not Asked    Back Care Not Asked    Exercise Not Asked    Bike Helmet Not Asked    [facility] Not Asked    Self-Exams Not Asked   Social History Narrative    Not on file     Social Determinants of Health     Financial Resource Strain:     Difficulty of Paying Living Expenses:    Food Insecurity:     Worried About Running Out of Food in the Last Year:     Ran Out of Food in the Last Year:    Transportation Needs:     Lack of Transportation (Medical):      Lack of Transportation (Non-Medical):    Physical Activity:     Days of Exercise per Week:     Minutes of Exercise per Session:    Stress:     Feeling of Stress :    Social Connections:     Frequency of Communication with Friends and Family:     Frequency of Social Gatherings with Friends and Family:     Attends Jewish Services:     Active Member of Clubs or Organizations:     Attends Club or Organization Meetings:     Marital Status:    Intimate Partner Violence:     Fear of Current or Ex-Partner:     Emotionally Abused:     Physically Abused:     Sexually Abused:        Past Surgical History:   Procedure Laterality Date    HX BREAST REDUCTION Bilateral 11/3/2020    BREAST REDUCTION BILATERAL performed by Felicia Jacobs MD at [place] [id number]         Past Medical History:   Diagnosis Date    Anemia     Anxiety     Disease of blood and blood forming organ     Hypertension     Morbid obesity ([address]     PCOS (polycystic ovarian syndrome)     Psychiatric problem          ROS:  A 12 point review of symptoms negative except for chief complaint as described above. PHYSICAL EXAM:  Blood pressure (!) 145/87, pulse 92, temperature 98 °F (36.7 °C), height 5' 8\" (1.727 m), weight 140.6 kg (310 lb), last menstrual period 12/10/2020, SpO2 100 %. Repeat BP - 138/84    Constitutional: The patient appears well, alert, oriented x 3. Cardiovascular: Heart RRR, no murmurs. Respiratory: Lungs clear, no respiratory distress  GI: Abdomen soft, nontender, no guarding  No fundal tenderness  Musculoskeletal: no cva tenderness  Lower ext: no edema, neg guzman's, reflexes +2  Skin: no rashes or lesions  Psychiatric:Mood/ Affect: appropriate  Genitourinary: SVE: long and closed  SSE: cerclage in place; no blood in the vagina  FHT: Category 1 with mod variability  TOCO: no ctx    I personally reviewed pt's medical record including relevant labs and ultrasounds  I reviewed the NST at today's encounter    Assessment/Plan:  Pt presents at 30w2d with an single episode of VB at the insertion of vaginal progesterone. There is no further VB at this time. There is no evidence of PTL or  contractions. Pt is discharged to home with VB and PTL precautions. Pt to f/u with her PObP.

## 2021-07-18 NOTE — PROGRESS NOTES
Pt arrived in [facility] with complaints of bleeding. States had spotting in panties and then when wiped a whole toilet paper swipe full of bright red blood. On arrival here bleeding had stopped. Placed on EFM. Dr. Barber [place] made aware of pt arrival. Pt has history of cerclage placement at 20 weeks and chronic hypertension.

## 2021-07-19 PROBLEM — Z3A.30 30 WEEKS GESTATION OF PREGNANCY: Status: ACTIVE | Noted: 2021-07-19

## 2021-07-19 PROBLEM — O10.913 PRE-EXISTING HYPERTENSION DURING PREGNANCY IN THIRD TRIMESTER: Status: ACTIVE | Noted: 2021-03-11

## 2021-07-19 PROBLEM — O26.873 CERVICAL SHORTENING AFFECTING PREGNANCY IN THIRD TRIMESTER: Status: ACTIVE | Noted: 2021-05-11

## 2021-07-19 PROBLEM — O09.93 HIGH-RISK PREGNANCY IN THIRD TRIMESTER: Status: ACTIVE | Noted: 2021-03-11

## 2021-07-26 PROBLEM — O36.8130 DECREASED FETAL MOVEMENT AFFECTING MANAGEMENT OF PREGNANCY IN THIRD TRIMESTER: Status: RESOLVED | Noted: 2021-07-14 | Resolved: 2021-07-26

## 2021-07-26 PROBLEM — O36.5990 PREGNANCY AFFECTED BY FETAL GROWTH RESTRICTION: Status: ACTIVE | Noted: 2021-07-26

## 2021-07-26 PROBLEM — O99.213 OBESITY AFFECTING PREGNANCY IN THIRD TRIMESTER: Status: ACTIVE | Noted: 2021-03-11

## 2021-07-26 PROBLEM — N93.9 VAGINAL SPOTTING: Status: RESOLVED | Noted: 2021-07-18 | Resolved: 2021-07-26

## 2021-08-03 ENCOUNTER — HOSPITAL ENCOUNTER (OUTPATIENT)
Age: 26
Discharge: OTHER INSTITUTION | End: 2021-08-04
Attending: OBSTETRICS & GYNECOLOGY | Admitting: OBSTETRICS & GYNECOLOGY
Payer: COMMERCIAL

## 2021-08-04 ENCOUNTER — APPOINTMENT (OUTPATIENT)
Dept: GENERAL RADIOLOGY | Age: 26
End: 2021-08-04
Attending: OBSTETRICS & GYNECOLOGY
Payer: COMMERCIAL

## 2021-08-04 VITALS
DIASTOLIC BLOOD PRESSURE: 76 MMHG | BODY MASS INDEX: 44.41 KG/M2 | HEART RATE: 120 BPM | RESPIRATION RATE: 22 BRPM | HEIGHT: 68 IN | TEMPERATURE: 100.9 F | OXYGEN SATURATION: 91 % | WEIGHT: 293 LBS | SYSTOLIC BLOOD PRESSURE: 137 MMHG

## 2021-08-04 PROBLEM — R06.02 SHORTNESS OF BREATH DUE TO PREGNANCY IN THIRD TRIMESTER: Status: ACTIVE | Noted: 2021-08-04

## 2021-08-04 PROBLEM — O26.893 SHORTNESS OF BREATH DUE TO PREGNANCY IN THIRD TRIMESTER: Status: ACTIVE | Noted: 2021-08-04

## 2021-08-04 LAB
ALBUMIN SERPL-MCNC: 2.8 G/DL (ref 3.5–5)
ALBUMIN/GLOB SERPL: 0.6 {RATIO} (ref 1.2–3.5)
ALP SERPL-CCNC: 128 U/L (ref 50–130)
ALT SERPL-CCNC: 41 U/L (ref 12–65)
ANION GAP SERPL CALC-SCNC: 10 MMOL/L (ref 7–16)
AST SERPL-CCNC: 36 U/L (ref 15–37)
BASOPHILS # BLD: 0 K/UL (ref 0–0.2)
BASOPHILS NFR BLD: 0 % (ref 0–2)
BILIRUB SERPL-MCNC: 0.4 MG/DL (ref 0.2–1.1)
BUN SERPL-MCNC: 7 MG/DL (ref 6–23)
CALCIUM SERPL-MCNC: 8.4 MG/DL (ref 8.3–10.4)
CHLORIDE SERPL-SCNC: 105 MMOL/L (ref 98–107)
CO2 SERPL-SCNC: 23 MMOL/L (ref 21–32)
COVID-19 RAPID TEST, COVR: DETECTED
CREAT SERPL-MCNC: 0.7 MG/DL (ref 0.6–1)
D DIMER PPP FEU-MCNC: 2.18 UG/ML(FEU)
DIFFERENTIAL METHOD BLD: ABNORMAL
EOSINOPHIL # BLD: 0 K/UL (ref 0–0.8)
EOSINOPHIL NFR BLD: 0 % (ref 0.5–7.8)
ERYTHROCYTE [DISTWIDTH] IN BLOOD BY AUTOMATED COUNT: 13.4 % (ref 11.9–14.6)
FIBRINOGEN PPP-MCNC: 466 MG/DL (ref 190–501)
GLOBULIN SER CALC-MCNC: 4.8 G/DL (ref 2.3–3.5)
GLUCOSE SERPL-MCNC: 112 MG/DL (ref 65–100)
GLUCOSE, GLUUPC: NEGATIVE
HCT VFR BLD AUTO: 38.1 % (ref 35.8–46.3)
HGB BLD-MCNC: 13.2 G/DL (ref 11.7–15.4)
IMM GRANULOCYTES # BLD AUTO: 0.2 K/UL (ref 0–0.5)
IMM GRANULOCYTES NFR BLD AUTO: 2 % (ref 0–5)
KETONES UR-MCNC: NORMAL MG/DL
LYMPHOCYTES # BLD: 1.2 K/UL (ref 0.5–4.6)
LYMPHOCYTES NFR BLD: 11 % (ref 13–44)
MCH RBC QN AUTO: 32.1 PG (ref 26.1–32.9)
MCHC RBC AUTO-ENTMCNC: 34.6 G/DL (ref 31.4–35)
MCV RBC AUTO: 92.7 FL (ref 79.6–97.8)
MONOCYTES # BLD: 0.8 K/UL (ref 0.1–1.3)
MONOCYTES NFR BLD: 7 % (ref 4–12)
NEUTS SEG # BLD: 8.3 K/UL (ref 1.7–8.2)
NEUTS SEG NFR BLD: 79 % (ref 43–78)
NRBC # BLD: 0 K/UL (ref 0–0.2)
PLATELET # BLD AUTO: 152 K/UL (ref 150–450)
PMV BLD AUTO: 10.2 FL (ref 9.4–12.3)
POTASSIUM SERPL-SCNC: 3.1 MMOL/L (ref 3.5–5.1)
PROT SERPL-MCNC: 7.6 G/DL (ref 6.3–8.2)
PROT UR QL: NORMAL
RBC # BLD AUTO: 4.11 M/UL (ref 4.05–5.2)
SODIUM SERPL-SCNC: 138 MMOL/L (ref 136–145)
SOURCE, COVRS: ABNORMAL
WBC # BLD AUTO: 10.4 K/UL (ref 4.3–11.1)

## 2021-08-04 PROCEDURE — 71045 X-RAY EXAM CHEST 1 VIEW: CPT

## 2021-08-04 PROCEDURE — 74011250636 HC RX REV CODE- 250/636: Performed by: OBSTETRICS & GYNECOLOGY

## 2021-08-04 PROCEDURE — 74011250637 HC RX REV CODE- 250/637: Performed by: OBSTETRICS & GYNECOLOGY

## 2021-08-04 PROCEDURE — 81002 URINALYSIS NONAUTO W/O SCOPE: CPT | Performed by: OBSTETRICS & GYNECOLOGY

## 2021-08-04 PROCEDURE — 96360 HYDRATION IV INFUSION INIT: CPT | Performed by: OBSTETRICS & GYNECOLOGY

## 2021-08-04 PROCEDURE — 87635 SARS-COV-2 COVID-19 AMP PRB: CPT

## 2021-08-04 PROCEDURE — 77030012341 HC CHMB SPCR OPTC MDI VYRM -A

## 2021-08-04 PROCEDURE — 59025 FETAL NON-STRESS TEST: CPT | Performed by: OBSTETRICS & GYNECOLOGY

## 2021-08-04 PROCEDURE — 85025 COMPLETE CBC W/AUTO DIFF WBC: CPT

## 2021-08-04 PROCEDURE — 94664 DEMO&/EVAL PT USE INHALER: CPT

## 2021-08-04 PROCEDURE — 96372 THER/PROPH/DIAG INJ SC/IM: CPT | Performed by: OBSTETRICS & GYNECOLOGY

## 2021-08-04 PROCEDURE — 94640 AIRWAY INHALATION TREATMENT: CPT

## 2021-08-04 PROCEDURE — 85379 FIBRIN DEGRADATION QUANT: CPT

## 2021-08-04 PROCEDURE — 85384 FIBRINOGEN ACTIVITY: CPT

## 2021-08-04 PROCEDURE — 96365 THER/PROPH/DIAG IV INF INIT: CPT | Performed by: OBSTETRICS & GYNECOLOGY

## 2021-08-04 PROCEDURE — 96366 THER/PROPH/DIAG IV INF ADDON: CPT | Performed by: OBSTETRICS & GYNECOLOGY

## 2021-08-04 PROCEDURE — 80053 COMPREHEN METABOLIC PANEL: CPT

## 2021-08-04 PROCEDURE — 74011000250 HC RX REV CODE- 250: Performed by: OBSTETRICS & GYNECOLOGY

## 2021-08-04 PROCEDURE — 96375 TX/PRO/DX INJ NEW DRUG ADDON: CPT | Performed by: OBSTETRICS & GYNECOLOGY

## 2021-08-04 PROCEDURE — 99285 EMERGENCY DEPT VISIT HI MDM: CPT | Performed by: OBSTETRICS & GYNECOLOGY

## 2021-08-04 RX ORDER — GUAIFENESIN 100 MG/5ML
400 SOLUTION ORAL
Status: DISCONTINUED | OUTPATIENT
Start: 2021-08-04 | End: 2021-08-04 | Stop reason: HOSPADM

## 2021-08-04 RX ORDER — DEXAMETHASONE SODIUM PHOSPHATE 100 MG/10ML
10 INJECTION INTRAMUSCULAR; INTRAVENOUS EVERY 12 HOURS
Status: DISCONTINUED | OUTPATIENT
Start: 2021-08-04 | End: 2021-08-04 | Stop reason: HOSPADM

## 2021-08-04 RX ORDER — GUAIFENESIN 200 MG/1
400 TABLET ORAL
Status: DISCONTINUED | OUTPATIENT
Start: 2021-08-04 | End: 2021-08-04 | Stop reason: SDUPTHER

## 2021-08-04 RX ORDER — HYDROCODONE BITARTRATE AND ACETAMINOPHEN 5; 325 MG/1; MG/1
2 TABLET ORAL
Status: COMPLETED | OUTPATIENT
Start: 2021-08-04 | End: 2021-08-04

## 2021-08-04 RX ORDER — UREA 10 %
1 LOTION (ML) TOPICAL DAILY
Status: DISCONTINUED | OUTPATIENT
Start: 2021-08-04 | End: 2021-08-04 | Stop reason: HOSPADM

## 2021-08-04 RX ORDER — ENOXAPARIN SODIUM 150 MG/ML
130 INJECTION SUBCUTANEOUS EVERY 24 HOURS
Status: DISCONTINUED | OUTPATIENT
Start: 2021-08-04 | End: 2021-08-04

## 2021-08-04 RX ORDER — SODIUM CHLORIDE, SODIUM LACTATE, POTASSIUM CHLORIDE, CALCIUM CHLORIDE 600; 310; 30; 20 MG/100ML; MG/100ML; MG/100ML; MG/100ML
250 INJECTION, SOLUTION INTRAVENOUS CONTINUOUS
Status: DISCONTINUED | OUTPATIENT
Start: 2021-08-04 | End: 2021-08-04 | Stop reason: HOSPADM

## 2021-08-04 RX ORDER — ALBUTEROL SULFATE 90 UG/1
2 AEROSOL, METERED RESPIRATORY (INHALATION)
Status: DISCONTINUED | OUTPATIENT
Start: 2021-08-04 | End: 2021-08-04 | Stop reason: HOSPADM

## 2021-08-04 RX ORDER — ASCORBIC ACID 500 MG
1000 TABLET ORAL DAILY
Status: DISCONTINUED | OUTPATIENT
Start: 2021-08-04 | End: 2021-08-04 | Stop reason: HOSPADM

## 2021-08-04 RX ORDER — ENOXAPARIN SODIUM 100 MG/ML
60 INJECTION SUBCUTANEOUS EVERY 12 HOURS
Status: DISCONTINUED | OUTPATIENT
Start: 2021-08-04 | End: 2021-08-04 | Stop reason: HOSPADM

## 2021-08-04 RX ORDER — DEXTROSE MONOHYDRATE, SODIUM CHLORIDE, SODIUM LACTATE, POTASSIUM CHLORIDE, CALCIUM CHLORIDE 5; 600; 310; 179; 20 G/100ML; MG/100ML; MG/100ML; MG/100ML; MG/100ML
INJECTION, SOLUTION INTRAVENOUS CONTINUOUS
Status: DISCONTINUED | OUTPATIENT
Start: 2021-08-04 | End: 2021-08-04 | Stop reason: HOSPADM

## 2021-08-04 RX ADMIN — DEXAMETHASONE SODIUM PHOSPHATE 10 MG: 10 INJECTION INTRAMUSCULAR; INTRAVENOUS at 01:17

## 2021-08-04 RX ADMIN — HYDROCODONE BITARTRATE AND ACETAMINOPHEN 2 TABLET: 5; 325 TABLET ORAL at 00:45

## 2021-08-04 RX ADMIN — DEXTROSE MONOHYDRATE, SODIUM CHLORIDE, SODIUM LACTATE, POTASSIUM CHLORIDE, CALCIUM CHLORIDE: 5; 600; 310; 179; 20 INJECTION, SOLUTION INTRAVENOUS at 01:37

## 2021-08-04 RX ADMIN — ENOXAPARIN SODIUM 60 MG: 60 INJECTION SUBCUTANEOUS at 02:19

## 2021-08-04 RX ADMIN — ALBUTEROL SULFATE 2 PUFF: 108 INHALANT RESPIRATORY (INHALATION) at 01:00

## 2021-08-04 RX ADMIN — FAMOTIDINE 20 MG: 10 INJECTION, SOLUTION INTRAVENOUS at 00:45

## 2021-08-04 RX ADMIN — SODIUM CHLORIDE, SODIUM LACTATE, POTASSIUM CHLORIDE, AND CALCIUM CHLORIDE 250 ML/HR: 600; 310; 30; 20 INJECTION, SOLUTION INTRAVENOUS at 00:37

## 2021-08-04 NOTE — PROGRESS NOTES
Lovenox given in abdomen. [place] and cardio off. IVF stopped and converted to [facility] well for transport.

## 2021-08-04 NOTE — PROGRESS NOTES
Patient complaning of chest pain she states that she knows it is from the ravioli she ate earlier. Pepcid given SIVP.

## 2021-08-04 NOTE — PROGRESS NOTES
Patient sitting straight up in bed, taking short shallow breaths. Patient given emesis bag for nausea. Patient feeling dizzy. Explained to patient that she needs to try and take big deep breaths in through her nose then blow out slow this will help decrease the dizziness and feeling to vomit.

## 2021-08-04 NOTE — PROGRESS NOTES
EMS here to take patient to [facility] for treatment. Patient is in agreement to go.

## 2021-08-04 NOTE — PROGRESS NOTES
M    Called by [facility] physician and chart reviewed. Pt well known to [facility] service due to co-morbidities. Pt presented by ems this evening for respiratory complaints. Eduardo Gallegos was found upon arrival to be Covid-19 positive. She is unvaccinated. She began feeling ill 2 days ago. Pregnancy c/b cervical insufficiency, obesity, chtn, and new fgr. Upon evaluation she is tachycardic and has hypoxia. Mild tachypnea. <BR >  Due to gestational age and high risk of respiratory failure, will likely need transfer to level 3 hospital. Will start empiric therapy for moderate Covid at this time. albuterol, decadron (will do 10mg B28 due to metabolic rate in pregnancy), zinc, vit C, prophylactic lovenox(with BMI will do 60 bid). Have low threshold for beginning remdesevir if further deterioration. Will get cxr at this time.      Theresa Wing rt

## 2021-08-04 NOTE — PROGRESS NOTES
Patient complains of upper chest pain states she knows its from the ravioli that she ate earlier today. Pepcid given SIVP for indigestion.

## 2021-08-04 NOTE — PROGRESS NOTES
Patient sitting in high fowlers position, taking short shallow breaths saying that she cant breath. Patient complaining of some nausea emesis bag given. Patient feeling dizzy, explained to patient that if she will try to take deeper breaths in through her nose and to blow out through her mouth this will help decrease the nausea and dizziness.

## 2021-08-04 NOTE — H&P
History & Physical    Name: Kvng Garcia MRN: [id number]  SSN: [id number]    YOB: 1995  Age: 22 y.o. Sex: female      Chief c/o: shortness of breath  Subjective:     Reason for Admission:  Pregnancy and COVID    History of Present Illness: Ms. Lauren Harper is a 22 y. o.  female with an estimated gestational age of 30w10d with Estimated Date of Delivery: 21. Patient complains of shortness of breath for 1 days. Pt reports that she started feeling poorly yesterday. Today she felt worse and began having shortness of breath this evening. She increasing felt short of breath so she called EMS for transport. Having some heartburn also. Pregnancy has been complicated by cervical incompetence, chronic hypertension and obesity. Patient denies nausea and vomiting, right upper quadrant pain  , swelling, vaginal bleeding  and vaginal leaking of fluid .     OB History    Para Term  AB Living   1 0 0 0 0 0   SAB TAB Ectopic Molar Multiple Live Births   0 0 0 0 0 0      # Outcome Date GA Lbr Jaspal/2nd Weight Sex Delivery Anes PTL Lv   1 Current              Past Medical History:   Diagnosis Date    Anemia     Anxiety     Disease of blood and blood forming organ     Hypertension     Morbid obesity ([address]     PCOS (polycystic ovarian syndrome)     Psychiatric problem      Past Surgical History:   Procedure Laterality Date    HX BREAST REDUCTION Bilateral 11/3/2020    BREAST REDUCTION BILATERAL performed by Mitali Jalloh MD at [place] [address] UNLISTED       Social History     Occupational History    Not on file   Tobacco Use    Smoking status: Never Smoker    Smokeless tobacco: Never Used   Vaping Use    Vaping Use: Never used   Substance and Sexual Activity    Alcohol use: Not Currently    Drug use: Never    Sexual activity: Yes     Partners: Male     Birth control/protection: None      Family History   Problem Relation Age of Onset    Hypertension Mother     Hypertension Father     Diabetes Maternal Grandmother     Breast Cancer Neg Hx     Colon Cancer Neg Hx     Ovarian Cancer Neg Hx        No Known Allergies  Prior to Admission medications    Medication Sig Start Date End Date Taking? Authorizing Provider   docusate sodium (COLACE) 100 mg capsule Take 100 mg by mouth two (2) times a day. Yes Provider, Historical   aspirin delayed-release 81 mg tablet Take 2 Tabs by mouth daily. 21  Yes Mercedes Wahl MD   cholecalciferol (VITAMIN D3) (2,000 UNITS /50 MCG) cap capsule Take 1 Cap by mouth daily. Indications: low vitamin D levels 21  Yes Mercedes Wahl MD   progesterone (PROMETRIUM) 200 mg capsule Insert 1 Cap into vagina nightly. 21  Yes Mercedes Wahl MD   ferrous sulfate (IRON PO) Take  by mouth. Yes Provider, Historical   prenatal 168/iron/folic/omega3 (ONE-A-DAY PRENATAL-1 PO) Take  by mouth. Yes Provider, Historical   NIFEdipine ER (Procardia XL) 30 mg ER tablet Take 1 Tablet by mouth every twelve (12) hours. Indications: pre-existing hypertension during pregnancy 21   Mercedes Wahl MD   acetaminophen (Tylenol Extra Strength) 500 mg tablet Take 500 mg by mouth every six (6) hours as needed for Pain. Provider, Historical        Review of Systems:  A comprehensive review of systems was negative except for that written in the History of Present Illness.  a 12 point review of systems negative except as written    Objective:     Vitals:    Vitals:    21 0115 21 0120 21 0121 21 0125   BP:   137/76    Pulse:   (!) 120    Resp:       Temp:       SpO2: 94% 93%  91%   Weight:       Height:          Temp (24hrs), Av.9 °F (38.3 °C), Min:100.9 °F (38.3 °C), Max:100.9 °F (38.3 °C)    BP  Min: 129/72  Max: 141/84     Physical Exam:  Patient tachypnic  Heart: Regular rate and rhythm  Lung: decreased breath sound, no rhonchi, wheezes or rales, tachypnic  Back: costovertebral angle tenderness absent  Abdomen: soft, nontender  Fundus: soft and non tender    Cervical Exam: deferred  Lower Extremities:  - Edema No   - Patellar Reflexes: 2+ bilaterally  Skin - no rashes     Membranes:  Intact  Uterine Activity:  None  Fetal Heart Rate:  Baseline: 155 per minute  Variability: moderate  Accelerations: no  Decelerations: none       Lab/Data Review:  Recent Results (from the past 24 hour(s))   COVID-19 RAPID TEST    Collection Time: 08/04/21 12:14 AM   Result Value Ref Range    Specimen source Nasopharyngeal      COVID-19 rapid test Detected (AA) NOTD     CBC WITH AUTOMATED DIFF    Collection Time: 08/04/21 12:34 AM   Result Value Ref Range    WBC 10.4 4.3 - 11.1 K/uL    RBC 4.11 4.05 - 5.2 M/uL    HGB 13.2 11.7 - 15.4 g/dL    HCT 38.1 35.8 - 46.3 %    MCV 92.7 79.6 - 97.8 FL    MCH 32.1 26.1 - 32.9 PG    MCHC 34.6 31.4 - 35.0 g/dL    RDW 13.4 11.9 - 14.6 %    PLATELET 072 500 - 562 K/uL    MPV 10.2 9.4 - 12.3 FL    ABSOLUTE NRBC 0.00 0.0 - 0.2 K/uL    DF AUTOMATED      NEUTROPHILS 79 (H) 43 - 78 %    LYMPHOCYTES 11 (L) 13 - 44 %    MONOCYTES 7 4.0 - 12.0 %    EOSINOPHILS 0 (L) 0.5 - 7.8 %    BASOPHILS 0 0.0 - 2.0 %    IMMATURE GRANULOCYTES 2 0.0 - 5.0 %    ABS. NEUTROPHILS 8.3 (H) 1.7 - 8.2 K/UL    ABS. LYMPHOCYTES 1.2 0.5 - 4.6 K/UL    ABS. MONOCYTES 0.8 0.1 - 1.3 K/UL    ABS. EOSINOPHILS 0.0 0.0 - 0.8 K/UL    ABS. BASOPHILS 0.0 0.0 - 0.2 K/UL    ABS. IMM.  GRANS. 0.2 0.0 - 0.5 K/UL   METABOLIC PANEL, COMPREHENSIVE    Collection Time: 08/04/21 12:34 AM   Result Value Ref Range    Sodium 138 136 - 145 mmol/L    Potassium 3.1 (L) 3.5 - 5.1 mmol/L    Chloride 105 98 - 107 mmol/L    CO2 23 21 - 32 mmol/L    Anion gap 10 7 - 16 mmol/L    Glucose 112 (H) 65 - 100 mg/dL    BUN 7 6 - 23 MG/DL    Creatinine 0.70 0.6 - 1.0 MG/DL    GFR est AA >60 >60 ml/min/1.73m2    GFR est non-AA >60 >60 ml/min/1.73m2    Calcium 8.4 8.3 - 10.4 MG/DL    Bilirubin, total 0.4 0.2 - 1.1 MG/DL    ALT (SGPT) 41 12 - 65 U/L    AST (SGOT) 36 15 - 37 U/L    Alk. phosphatase 128 50 - 130 U/L    Protein, total 7.6 6.3 - 8.2 g/dL    Albumin 2.8 (L) 3.5 - 5.0 g/dL    Globulin 4.8 (H) 2.3 - 3.5 g/dL    A-G Ratio 0.6 (L) 1.2 - 3.5     FIBRINOGEN    Collection Time: 08/04/21 12:34 AM   Result Value Ref Range    Fibrinogen 466 190 - 501 mg/dL   D DIMER    Collection Time: 08/04/21 12:34 AM   Result Value Ref Range    D DIMER 2.18 (H) <0.56 ug/ml(FEU)     ua - + 2 ketones  Assessment and Plan:      Active Problems:    Shortness of breath due to pregnancy in third trimester (8/4/2021)       COVID   Pt given 1 liter LR followed by D5LR at 125 cc  Given decadron, lovenox, norco  Albuterol inhaler with O2 sats remaining below 95%  CXR pending    Will transfer to [facility] in case delivery indicated for worsening resp status

## 2021-08-04 NOTE — PROGRESS NOTES
Patient presents to triage from home via EMS with complaints of SOB has been going on all day but got suddenly worse. Denies any OB complaints , no bleeding,no LOF, good fetal movement.

## 2021-08-04 NOTE — PROGRESS NOTES
[id number] Patient has chosen [facility] as her first choice for SNF. Referral sent. Still no decision on dialysis center. [address] from [facility] not in network with insurance. [address] referral sent to patient's next choice - [address]. [address] from [facility] here to see another patient, referral given and verifying if there is a female bed and if they take [employer]. 1330 spoke with Eleni Ayon from [facility] and they do take patient's insurance. Will review to see if they can accept patient. 34 [facility] / [facility] called back and needs clarification of plan for oral chemo as it is expensive and patient cannot bring her own supply. 1600 spoke with Dr. Juanita Sahni to clarify oncology plan, patient will not be on Pazopanib and will need to go to [place]. Umm's for immuno therapy every 3 weeks. Rigoberto Poll with this update and she will check with her administration to see if patient can be accepted. Patient stat Covid test back is positive. Patient placed on droplet and isolation precautions.

## 2021-08-17 PROBLEM — U07.1 COVID-19 AFFECTING PREGNANCY IN THIRD TRIMESTER: Status: ACTIVE | Noted: 2021-08-17

## 2021-08-17 PROBLEM — R06.02 SHORTNESS OF BREATH DUE TO PREGNANCY IN THIRD TRIMESTER: Status: RESOLVED | Noted: 2021-08-04 | Resolved: 2021-08-17

## 2021-08-17 PROBLEM — Z3A.34 34 WEEKS GESTATION OF PREGNANCY: Status: ACTIVE | Noted: 2021-08-17

## 2021-08-17 PROBLEM — O98.513 COVID-19 AFFECTING PREGNANCY IN THIRD TRIMESTER: Status: ACTIVE | Noted: 2021-08-17

## 2021-08-17 PROBLEM — O26.893 SHORTNESS OF BREATH DUE TO PREGNANCY IN THIRD TRIMESTER: Status: RESOLVED | Noted: 2021-08-04 | Resolved: 2021-08-17

## 2021-08-18 PROBLEM — Z3A.34 34 WEEKS GESTATION OF PREGNANCY: Status: RESOLVED | Noted: 2021-08-17 | Resolved: 2021-08-18

## 2021-08-24 PROBLEM — Z3A.35 35 WEEKS GESTATION OF PREGNANCY: Status: ACTIVE | Noted: 2021-08-24

## 2021-08-27 PROBLEM — B95.1 POSITIVE GBS TEST: Status: ACTIVE | Noted: 2021-08-27

## 2021-08-31 PROBLEM — Z3A.36 36 WEEKS GESTATION OF PREGNANCY: Status: ACTIVE | Noted: 2021-08-24

## 2021-09-03 ENCOUNTER — HOSPITAL ENCOUNTER (INPATIENT)
Age: 26
LOS: 3 days | Discharge: HOME | DRG: 560 | End: 2021-09-06
Attending: OBSTETRICS & GYNECOLOGY | Admitting: OBSTETRICS & GYNECOLOGY
Payer: COMMERCIAL

## 2021-09-03 DIAGNOSIS — O26.873 CERVICAL SHORTENING AFFECTING PREGNANCY IN THIRD TRIMESTER: ICD-10-CM

## 2021-09-03 PROBLEM — Z34.90 ENCOUNTER FOR INDUCTION OF LABOR: Status: ACTIVE | Noted: 2021-09-03

## 2021-09-03 LAB
ABO + RH BLD: NORMAL
BLOOD GROUP ANTIBODIES SERPL: NORMAL
ERYTHROCYTE [DISTWIDTH] IN BLOOD BY AUTOMATED COUNT: 14.6 % (ref 11.9–14.6)
HCT VFR BLD AUTO: 33.2 % (ref 35.8–46.3)
HGB BLD-MCNC: 11.3 G/DL (ref 11.7–15.4)
MCH RBC QN AUTO: 32.9 PG (ref 26.1–32.9)
MCHC RBC AUTO-ENTMCNC: 34 G/DL (ref 31.4–35)
MCV RBC AUTO: 96.8 FL (ref 79.6–97.8)
NRBC # BLD: 0 K/UL (ref 0–0.2)
PLATELET # BLD AUTO: 238 K/UL (ref 150–450)
PMV BLD AUTO: 10.8 FL (ref 9.4–12.3)
RBC # BLD AUTO: 3.43 M/UL (ref 4.05–5.2)
SPECIMEN EXP DATE BLD: NORMAL
WBC # BLD AUTO: 13 K/UL (ref 4.3–11.1)

## 2021-09-03 PROCEDURE — 74011250636 HC RX REV CODE- 250/636

## 2021-09-03 PROCEDURE — 65270000029 HC RM PRIVATE

## 2021-09-03 PROCEDURE — 86901 BLOOD TYPING SEROLOGIC RH(D): CPT

## 2021-09-03 PROCEDURE — 0UCC7ZZ EXTIRPATION OF MATTER FROM CERVIX, VIA NATURAL OR ARTIFICIAL OPENING: ICD-10-PCS | Performed by: OBSTETRICS & GYNECOLOGY

## 2021-09-03 PROCEDURE — 59871 REMOVE CERCLAGE SUTURE: CPT | Performed by: OBSTETRICS & GYNECOLOGY

## 2021-09-03 PROCEDURE — 74011250636 HC RX REV CODE- 250/636: Performed by: OBSTETRICS & GYNECOLOGY

## 2021-09-03 PROCEDURE — 74011250637 HC RX REV CODE- 250/637: Performed by: OBSTETRICS & GYNECOLOGY

## 2021-09-03 PROCEDURE — 74011000258 HC RX REV CODE- 258: Performed by: OBSTETRICS & GYNECOLOGY

## 2021-09-03 PROCEDURE — 85027 COMPLETE CBC AUTOMATED: CPT

## 2021-09-03 RX ORDER — SODIUM CHLORIDE 0.9 % (FLUSH) 0.9 %
5-40 SYRINGE (ML) INJECTION AS NEEDED
Status: DISCONTINUED | OUTPATIENT
Start: 2021-09-03 | End: 2021-09-04

## 2021-09-03 RX ORDER — OXYTOCIN/RINGER'S LACTATE 30/500 ML
PLASTIC BAG, INJECTION (ML) INTRAVENOUS
Status: COMPLETED
Start: 2021-09-03 | End: 2021-09-03

## 2021-09-03 RX ORDER — SODIUM CHLORIDE 0.9 % (FLUSH) 0.9 %
5-40 SYRINGE (ML) INJECTION EVERY 8 HOURS
Status: DISCONTINUED | OUTPATIENT
Start: 2021-09-03 | End: 2021-09-04

## 2021-09-03 RX ORDER — BUTORPHANOL TARTRATE 2 MG/ML
1 INJECTION INTRAMUSCULAR; INTRAVENOUS
Status: DISCONTINUED | OUTPATIENT
Start: 2021-09-03 | End: 2021-09-04

## 2021-09-03 RX ORDER — LIDOCAINE HYDROCHLORIDE 10 MG/ML
1 INJECTION INFILTRATION; PERINEURAL
Status: ACTIVE | OUTPATIENT
Start: 2021-09-03 | End: 2021-09-04

## 2021-09-03 RX ORDER — ONDANSETRON 2 MG/ML
4 INJECTION INTRAMUSCULAR; INTRAVENOUS ONCE
Status: DISCONTINUED | OUTPATIENT
Start: 2021-09-03 | End: 2021-09-03

## 2021-09-03 RX ORDER — DEXTROSE, SODIUM CHLORIDE, SODIUM LACTATE, POTASSIUM CHLORIDE, AND CALCIUM CHLORIDE 5; .6; .31; .03; .02 G/100ML; G/100ML; G/100ML; G/100ML; G/100ML
125 INJECTION, SOLUTION INTRAVENOUS CONTINUOUS
Status: DISCONTINUED | OUTPATIENT
Start: 2021-09-03 | End: 2021-09-04

## 2021-09-03 RX ORDER — ONDANSETRON 2 MG/ML
4 INJECTION INTRAMUSCULAR; INTRAVENOUS ONCE
Status: COMPLETED | OUTPATIENT
Start: 2021-09-03 | End: 2021-09-03

## 2021-09-03 RX ORDER — MINERAL OIL
120 OIL (ML) ORAL
Status: DISPENSED | OUTPATIENT
Start: 2021-09-03 | End: 2021-09-04

## 2021-09-03 RX ORDER — OXYTOCIN/RINGER'S LACTATE 30/500 ML
87.3 PLASTIC BAG, INJECTION (ML) INTRAVENOUS AS NEEDED
Status: COMPLETED | OUTPATIENT
Start: 2021-09-03 | End: 2021-09-04

## 2021-09-03 RX ORDER — MORPHINE SULFATE 10 MG/ML
4 INJECTION, SOLUTION INTRAMUSCULAR; INTRAVENOUS ONCE
Status: COMPLETED | OUTPATIENT
Start: 2021-09-03 | End: 2021-09-03

## 2021-09-03 RX ORDER — ACETAMINOPHEN 500 MG
1000 TABLET ORAL ONCE
Status: COMPLETED | OUTPATIENT
Start: 2021-09-03 | End: 2021-09-03

## 2021-09-03 RX ORDER — ACETAMINOPHEN 325 MG/1
650 TABLET ORAL
Status: DISCONTINUED | OUTPATIENT
Start: 2021-09-03 | End: 2021-09-04

## 2021-09-03 RX ORDER — MORPHINE SULFATE 10 MG/ML
4 INJECTION, SOLUTION INTRAMUSCULAR; INTRAVENOUS ONCE
Status: DISCONTINUED | OUTPATIENT
Start: 2021-09-03 | End: 2021-09-03

## 2021-09-03 RX ORDER — OXYTOCIN/RINGER'S LACTATE 30/500 ML
1-25 PLASTIC BAG, INJECTION (ML) INTRAVENOUS
Status: DISCONTINUED | OUTPATIENT
Start: 2021-09-03 | End: 2021-09-04

## 2021-09-03 RX ORDER — NIFEDIPINE 30 MG/1
30 TABLET, EXTENDED RELEASE ORAL 2 TIMES DAILY
Status: DISCONTINUED | OUTPATIENT
Start: 2021-09-03 | End: 2021-09-04

## 2021-09-03 RX ORDER — OXYTOCIN/RINGER'S LACTATE 30/500 ML
10 PLASTIC BAG, INJECTION (ML) INTRAVENOUS AS NEEDED
Status: COMPLETED | OUTPATIENT
Start: 2021-09-03 | End: 2021-09-04

## 2021-09-03 RX ORDER — LIDOCAINE HYDROCHLORIDE 20 MG/ML
JELLY TOPICAL
Status: ACTIVE | OUTPATIENT
Start: 2021-09-03 | End: 2021-09-04

## 2021-09-03 RX ADMIN — Medication 10 MILLI-UNITS/MIN: at 23:02

## 2021-09-03 RX ADMIN — NIFEDIPINE 30 MG: 30 TABLET, FILM COATED, EXTENDED RELEASE ORAL at 18:21

## 2021-09-03 RX ADMIN — ACETAMINOPHEN 1000 MG: 500 TABLET, FILM COATED ORAL at 09:34

## 2021-09-03 RX ADMIN — Medication 4 MILLI-UNITS/MIN: at 20:52

## 2021-09-03 RX ADMIN — SODIUM CHLORIDE, SODIUM LACTATE, POTASSIUM CHLORIDE, CALCIUM CHLORIDE, AND DEXTROSE MONOHYDRATE 125 ML/HR: 600; 310; 30; 20; 5 INJECTION, SOLUTION INTRAVENOUS at 18:15

## 2021-09-03 RX ADMIN — SODIUM CHLORIDE 5 MILLION UNITS: 900 INJECTION INTRAVENOUS at 18:15

## 2021-09-03 RX ADMIN — Medication 12 MILLI-UNITS/MIN: at 23:26

## 2021-09-03 RX ADMIN — ONDANSETRON 4 MG: 2 INJECTION INTRAMUSCULAR; INTRAVENOUS at 08:29

## 2021-09-03 RX ADMIN — SODIUM CHLORIDE, SODIUM LACTATE, POTASSIUM CHLORIDE, CALCIUM CHLORIDE, AND DEXTROSE MONOHYDRATE 125 ML/HR: 600; 310; 30; 20; 5 INJECTION, SOLUTION INTRAVENOUS at 20:48

## 2021-09-03 RX ADMIN — ACETAMINOPHEN 650 MG: 325 TABLET, FILM COATED ORAL at 20:10

## 2021-09-03 RX ADMIN — Medication 6 MILLI-UNITS/MIN: at 22:15

## 2021-09-03 RX ADMIN — NIFEDIPINE 30 MG: 30 TABLET, FILM COATED, EXTENDED RELEASE ORAL at 10:23

## 2021-09-03 RX ADMIN — SODIUM CHLORIDE 2.5 MILLION UNITS: 9 INJECTION, SOLUTION INTRAVENOUS at 22:13

## 2021-09-03 RX ADMIN — SODIUM CHLORIDE, SODIUM LACTATE, POTASSIUM CHLORIDE, CALCIUM CHLORIDE, AND DEXTROSE MONOHYDRATE 125 ML/HR: 600; 310; 30; 20; 5 INJECTION, SOLUTION INTRAVENOUS at 08:20

## 2021-09-03 RX ADMIN — MORPHINE SULFATE 4 MG: 10 INJECTION INTRAVENOUS at 08:29

## 2021-09-03 RX ADMIN — MISOPROSTOL 50 MCG: 100 TABLET ORAL at 13:06

## 2021-09-03 RX ADMIN — Medication 2 MILLI-UNITS/MIN: at 18:19

## 2021-09-03 RX ADMIN — Medication 8 MILLI-UNITS/MIN: at 22:38

## 2021-09-03 RX ADMIN — BUTORPHANOL TARTRATE 1 MG: 2 INJECTION, SOLUTION INTRAMUSCULAR; INTRAVENOUS at 20:47

## 2021-09-03 RX ADMIN — MISOPROSTOL 50 MCG: 100 TABLET ORAL at 08:56

## 2021-09-03 NOTE — PROGRESS NOTES
Pt admitted to room 434. RN to bedside for admission assessment. Pt has arrived for scheduled cerclage removal with induction of labor. Consents signed. Admission database complete. POC discussed with pt. Questions and concerns discussed. EFM on and tracing. IV started, labs collected and sent. Bed low and locked, call light within reach. [place], at bedside. No needs/concerns at this time.

## 2021-09-03 NOTE — PROGRESS NOTES
Pt back to the bed, efm reapplied and readjusted.    Will trace baby and then sve prior to getting her next dose of cytotec

## 2021-09-03 NOTE — PROGRESS NOTES
Baby very difficult to trace. Attempt to placed wireless justina tracing unsuccessful. Back to external tracing. Pt snoring and is asleep.

## 2021-09-03 NOTE — ASSESSMENT & PLAN NOTE
AC < 3rd on last US  Followed by MFM with IOL recommended at 37 weeks   FHTs cat 1   Will proceed with IOL

## 2021-09-03 NOTE — PROGRESS NOTES
See MAR- started pcn, d5lr and pitocin  Explained plan of care to pt, process, and medications  No questions at this time  Pt feels like every time I have a contraction, I have to go to the bathroom.

## 2021-09-03 NOTE — H&P
History and Physical    Labor Induction Admission Note    Wanda Barrow  [id number]  unknown    OB History        1    Para   0    Term   0       0    AB   0    Living   0       SAB   0    TAB   0    Ectopic   0    Molar   0    Multiple   0    Live Births   0                Patient admitted with pregnancy at 37w0d for induction of labor due to IUGR. Prenatal course was was complicated by [facility], COVID infection. Please see prenatal records for details. Prior to Admission Medications   Prescriptions Last Dose Informant Patient Reported? Taking? NIFEdipine ER (Procardia XL) 30 mg ER tablet 2021 at Unknown time  No Yes   Sig: Take 1 Tablet by mouth every twelve (12) hours. Indications: pre-existing hypertension during pregnancy   acetaminophen (Tylenol Extra Strength) 500 mg tablet 8/3/2021 at Unknown time  Yes Yes   Sig: Take 500 mg by mouth every six (6) hours as needed for Pain. cholecalciferol (VITAMIN D3) (2,000 UNITS /50 MCG) cap capsule 2021 at Unknown time  No Yes   Sig: Take 1 Cap by mouth daily. Indications: low vitamin D levels   docusate sodium (COLACE) 100 mg capsule 8/3/2021 at Unknown time  Yes Yes   Sig: Take 100 mg by mouth two (2) times a day. famotidine (PEPCID) 20 mg tablet Not Taking at Unknown time  No No   Sig: Take 1 Tablet by mouth two (2) times a day. Indications: gastroesophageal reflux disease   Patient not taking: Reported on 9/3/2021   ferrous sulfate (IRON PO) 2021 at Unknown time  Yes Yes   Sig: Take  by mouth.   prenatal 168/iron/folic/omega3 (ONE-A-DAY PRENATAL-1 PO) 2021 at Unknown time  Yes Yes   Sig: Take  by mouth.   progesterone (PROMETRIUM) 200 mg capsule 8/3/2021 at Unknown time  No Yes   Sig: Insert 1 Cap into vagina nightly.       Facility-Administered Medications: None         EXAM: Cervical Exam:  1.5/th/high after cerclage removal     Membranes:  Intact                Fetal Heart Rate: Reactive       Labs:   Lab Results   Component Value Date/Time    ABO/Rh(D) B POSITIVE 09/03/2021 06:27 AM    Antibody screen NEG 09/03/2021 06:27 AM        Plan: Admit for induction of labor. Group B Strep positive, will treat prophylactically with penicillin. Pre-existing hypertension during pregnancy in third trimester  Continue Procardia 30 XR  Lasix postpartum   Monitor BPs    Pregnancy affected by fetal growth restriction  AC < 3rd on last US  Followed by MFM with IOL recommended at 37 weeks   FHTs cat 1   Will proceed with IOL    COVID-19 affecting pregnancy in third trimester  Recovered.   S/p ICU stay at [facility]    Cervical shortening affecting pregnancy in third trimester  Cerclage removed this AM. See separate procedure note     Positive GBS test  PCN intrapartum         Favio Blackburn DO  September 3, 2021   9:45 AM

## 2021-09-03 NOTE — PROCEDURES
Cerclage Removal:     Patient pre-medicated with morphine and placed in stirups. Metal speculum inserted into vagina and cerclage easily visualized. Knot was elevated with pickups and 1 side of suture was cut with scissors. Entire cerclage easily was removed. All instruments were removed. Patient tolerated procedure well. Cervix was checked and she was 1.5cm/th/high. Will proceed with IOL.      Jae Gamino, DO

## 2021-09-03 NOTE — PROGRESS NOTES
sbar to [employer]. Pt assumed for care. Pt assisted oob to the bathroom. Gait steady.  Pitocin remains at 2 mu/min

## 2021-09-04 ENCOUNTER — ANESTHESIA EVENT (OUTPATIENT)
Dept: LABOR AND DELIVERY | Age: 26
DRG: 560 | End: 2021-09-04
Payer: COMMERCIAL

## 2021-09-04 ENCOUNTER — ANESTHESIA (OUTPATIENT)
Dept: LABOR AND DELIVERY | Age: 26
DRG: 560 | End: 2021-09-04
Payer: COMMERCIAL

## 2021-09-04 LAB
ARTERIAL PATENCY WRIST A: ABNORMAL
ARTERIAL PATENCY WRIST A: ABNORMAL
BASE DEFICIT BLD-SCNC: 5.4 MMOL/L
BASE DEFICIT BLD-SCNC: 5.8 MMOL/L
HCO3 BLD-SCNC: 23.4 MMOL/L (ref 22–26)
HCO3 BLDV-SCNC: 21.6 MMOL/L (ref 23–28)
O2/TOTAL GAS SETTING VFR VENT: 21 %
O2/TOTAL GAS SETTING VFR VENT: 21 %
PCO2 BLDCO: 47 MMHG (ref 32–68)
PCO2 BLDCO: 62 MMHG (ref 32–68)
PH BLDCO: 7.19 [PH] (ref 7.15–7.38)
PH BLDCO: 7.27 [PH] (ref 7.15–7.38)
PO2 BLDCO: 19 MMHG
PO2 BLDCO: 26 MMHG
SAO2 % BLD: 19 % (ref 95–98)
SAO2 % BLDV: 39.8 % (ref 65–88)
SERVICE CMNT-IMP: ABNORMAL
SERVICE CMNT-IMP: ABNORMAL
SPECIMEN TYPE: ABNORMAL
SPECIMEN TYPE: ABNORMAL

## 2021-09-04 PROCEDURE — 2709999900 HC NON-CHARGEABLE SUPPLY

## 2021-09-04 PROCEDURE — 74011250637 HC RX REV CODE- 250/637: Performed by: OBSTETRICS & GYNECOLOGY

## 2021-09-04 PROCEDURE — 59409 OBSTETRICAL CARE: CPT | Performed by: OBSTETRICS & GYNECOLOGY

## 2021-09-04 PROCEDURE — 76060000078 HC EPIDURAL ANESTHESIA

## 2021-09-04 PROCEDURE — 77030011943

## 2021-09-04 PROCEDURE — 75410000002 HC LABOR FEE PER 1 HR

## 2021-09-04 PROCEDURE — 74011250636 HC RX REV CODE- 250/636: Performed by: NURSE ANESTHETIST, CERTIFIED REGISTERED

## 2021-09-04 PROCEDURE — 3E0P7VZ INTRODUCTION OF HORMONE INTO FEMALE REPRODUCTIVE, VIA NATURAL OR ARTIFICIAL OPENING: ICD-10-PCS | Performed by: OBSTETRICS & GYNECOLOGY

## 2021-09-04 PROCEDURE — A4300 CATH IMPL VASC ACCESS PORTAL: HCPCS | Performed by: ANESTHESIOLOGY

## 2021-09-04 PROCEDURE — 0UQMXZZ REPAIR VULVA, EXTERNAL APPROACH: ICD-10-PCS | Performed by: OBSTETRICS & GYNECOLOGY

## 2021-09-04 PROCEDURE — 74011250636 HC RX REV CODE- 250/636: Performed by: OBSTETRICS & GYNECOLOGY

## 2021-09-04 PROCEDURE — 10907ZC DRAINAGE OF AMNIOTIC FLUID, THERAPEUTIC FROM PRODUCTS OF CONCEPTION, VIA NATURAL OR ARTIFICIAL OPENING: ICD-10-PCS | Performed by: OBSTETRICS & GYNECOLOGY

## 2021-09-04 PROCEDURE — 65270000029 HC RM PRIVATE

## 2021-09-04 PROCEDURE — 77030003028 HC SUT VCRL J&J -A

## 2021-09-04 PROCEDURE — 74011000250 HC RX REV CODE- 250: Performed by: ANESTHESIOLOGY

## 2021-09-04 PROCEDURE — 88307 TISSUE EXAM BY PATHOLOGIST: CPT

## 2021-09-04 PROCEDURE — 82803 BLOOD GASES ANY COMBINATION: CPT

## 2021-09-04 PROCEDURE — 0HQ9XZZ REPAIR PERINEUM SKIN, EXTERNAL APPROACH: ICD-10-PCS | Performed by: OBSTETRICS & GYNECOLOGY

## 2021-09-04 PROCEDURE — 75410000003 HC RECOV DEL/VAG/CSECN EA 0.5 HR

## 2021-09-04 PROCEDURE — 77030014125 HC TY EPDRL BBMI -B: Performed by: ANESTHESIOLOGY

## 2021-09-04 PROCEDURE — 3E033VJ INTRODUCTION OF OTHER HORMONE INTO PERIPHERAL VEIN, PERCUTANEOUS APPROACH: ICD-10-PCS | Performed by: OBSTETRICS & GYNECOLOGY

## 2021-09-04 PROCEDURE — 00HU33Z INSERTION OF INFUSION DEVICE INTO SPINAL CANAL, PERCUTANEOUS APPROACH: ICD-10-PCS | Performed by: ANESTHESIOLOGY

## 2021-09-04 PROCEDURE — 74011000258 HC RX REV CODE- 258: Performed by: OBSTETRICS & GYNECOLOGY

## 2021-09-04 PROCEDURE — 75410000000 HC DELIVERY VAGINAL/SINGLE

## 2021-09-04 PROCEDURE — 77030018846 HC SOL IRR STRL H20 ICUM -A

## 2021-09-04 RX ORDER — DIPHENHYDRAMINE HCL 25 MG
25 CAPSULE ORAL
Status: DISCONTINUED | OUTPATIENT
Start: 2021-09-04 | End: 2021-09-06 | Stop reason: HOSPADM

## 2021-09-04 RX ORDER — IBUPROFEN 600 MG/1
600 TABLET ORAL
Status: DISCONTINUED | OUTPATIENT
Start: 2021-09-04 | End: 2021-09-06 | Stop reason: HOSPADM

## 2021-09-04 RX ORDER — NIFEDIPINE 30 MG/1
30 TABLET, EXTENDED RELEASE ORAL EVERY 12 HOURS
Status: DISCONTINUED | OUTPATIENT
Start: 2021-09-04 | End: 2021-09-06 | Stop reason: HOSPADM

## 2021-09-04 RX ORDER — ACETAMINOPHEN 325 MG/1
650 TABLET ORAL
Status: DISCONTINUED | OUTPATIENT
Start: 2021-09-04 | End: 2021-09-06 | Stop reason: HOSPADM

## 2021-09-04 RX ORDER — ONDANSETRON 4 MG/1
4 TABLET, ORALLY DISINTEGRATING ORAL
Status: ACTIVE | OUTPATIENT
Start: 2021-09-04 | End: 2021-09-05

## 2021-09-04 RX ORDER — HYDROCORTISONE ACETATE PRAMOXINE HCL 2.5; 1 G/100G; G/100G
CREAM TOPICAL AS NEEDED
Status: DISCONTINUED | OUTPATIENT
Start: 2021-09-04 | End: 2021-09-06 | Stop reason: HOSPADM

## 2021-09-04 RX ORDER — FUROSEMIDE 20 MG/1
20 TABLET ORAL DAILY
Status: DISCONTINUED | OUTPATIENT
Start: 2021-09-05 | End: 2021-09-06 | Stop reason: HOSPADM

## 2021-09-04 RX ORDER — LIDOCAINE HYDROCHLORIDE AND EPINEPHRINE 15; 5 MG/ML; UG/ML
INJECTION, SOLUTION EPIDURAL
Status: COMPLETED | OUTPATIENT
Start: 2021-09-04 | End: 2021-09-04

## 2021-09-04 RX ORDER — DOCUSATE SODIUM 100 MG/1
100 CAPSULE, LIQUID FILLED ORAL 2 TIMES DAILY
Status: DISCONTINUED | OUTPATIENT
Start: 2021-09-04 | End: 2021-09-06 | Stop reason: HOSPADM

## 2021-09-04 RX ORDER — ROPIVACAINE HYDROCHLORIDE 2 MG/ML
INJECTION, SOLUTION EPIDURAL; INFILTRATION; PERINEURAL
Status: DISCONTINUED | OUTPATIENT
Start: 2021-09-04 | End: 2021-09-04 | Stop reason: HOSPADM

## 2021-09-04 RX ORDER — SIMETHICONE 80 MG
80 TABLET,CHEWABLE ORAL
Status: DISCONTINUED | OUTPATIENT
Start: 2021-09-04 | End: 2021-09-06 | Stop reason: HOSPADM

## 2021-09-04 RX ADMIN — SODIUM CHLORIDE 2.5 MILLION UNITS: 9 INJECTION, SOLUTION INTRAVENOUS at 02:02

## 2021-09-04 RX ADMIN — DOCUSATE SODIUM 100 MG: 100 CAPSULE, LIQUID FILLED ORAL at 22:05

## 2021-09-04 RX ADMIN — ROPIVACAINE HYDROCHLORIDE 10 ML/HR: 2 INJECTION, SOLUTION EPIDURAL; INFILTRATION at 00:30

## 2021-09-04 RX ADMIN — IBUPROFEN 600 MG: 600 TABLET, FILM COATED ORAL at 21:09

## 2021-09-04 RX ADMIN — Medication 10000 MILLI-UNITS: at 04:33

## 2021-09-04 RX ADMIN — NIFEDIPINE 30 MG: 30 TABLET, FILM COATED, EXTENDED RELEASE ORAL at 20:56

## 2021-09-04 RX ADMIN — HYDROCORTISONE ACETATE PRAMOXINE HCL: 2.5; 1 CREAM TOPICAL at 19:20

## 2021-09-04 RX ADMIN — Medication 14 MILLI-UNITS/MIN: at 00:43

## 2021-09-04 RX ADMIN — Medication 87.3 MILLI-UNITS/MIN: at 05:00

## 2021-09-04 RX ADMIN — IBUPROFEN 600 MG: 600 TABLET, FILM COATED ORAL at 08:49

## 2021-09-04 RX ADMIN — DOCUSATE SODIUM 100 MG: 100 CAPSULE, LIQUID FILLED ORAL at 08:49

## 2021-09-04 RX ADMIN — NIFEDIPINE 30 MG: 30 TABLET, FILM COATED, EXTENDED RELEASE ORAL at 08:49

## 2021-09-04 RX ADMIN — LIDOCAINE HYDROCHLORIDE,EPINEPHRINE BITARTRATE 4.5 ML: 15; .005 INJECTION, SOLUTION EPIDURAL; INFILTRATION; INTRACAUDAL; PERINEURAL at 00:19

## 2021-09-04 RX ADMIN — WITCH HAZEL 1 PAD: 500 SOLUTION RECTAL; TOPICAL at 19:19

## 2021-09-04 NOTE — PROGRESS NOTES
Dr Elvis Mccullough notified of /86, MD states may modify Procardia to every 12 hrs, lasix to begin tomorrow 9/5/21. Read back.

## 2021-09-04 NOTE — ANESTHESIA PREPROCEDURE EVALUATION
Relevant Problems   No relevant active problems       Anesthetic History   No history of anesthetic complications            Review of Systems / Medical History  Patient summary reviewed and pertinent labs reviewed    Pulmonary                Comments: Recent h/o COVID requiring ICU admission on [place] O2 therapy   Neuro/Psych   Within defined limits           Cardiovascular    Hypertension (Chronic)                   GI/Hepatic/Renal                Endo/Other        Morbid obesity     Other Findings   Comments: PCOS           Physical Exam    Airway  Mallampati: II  TM Distance: 4 - 6 cm  Neck ROM: normal range of motion   Mouth opening: Normal     Cardiovascular    Rhythm: regular  Rate: normal         Dental  No notable dental hx       Pulmonary  Breath sounds clear to auscultation               Abdominal  GI exam deferred       Other Findings            Anesthetic Plan    ASA: 3  Anesthesia type: epidural            Anesthetic plan and risks discussed with: Patient and Spouse      Discussed anesthesia options for labor analgesia and patient wishes to proceed with CHAPIN

## 2021-09-04 NOTE — L&D DELIVERY NOTE
Called to room for patient complete/complete/+2 station    Patient pushing with delivery of head in OA position. Shoulders then followed. Cord was clamped and cut and infant handed to awaiting nurses. Apgars 7 & 9. Cord blood and gas were obtained. Placenta delivered spontaneously. A first degree laceration and left peru-urethral laceration was noted and repaired in the usual fashion with 3.0 Vicryl. Fundus firm. Mom and baby stable.      Ada Blackburn, DO

## 2021-09-04 NOTE — PROGRESS NOTES
Patient request tylenol for abdominal cramping patient is aware that she does have stadol IV ordered.   Patient declines stadol at this time would like to try tylenol first.

## 2021-09-04 NOTE — PROGRESS NOTES
Safety Teaching reviewed:   1. Hand hygiene prior to handling the infant. 2. Use of bulb syringe  3. Bracelets with matching numbers are placed on mother and infant  3. An infant security tag  [facility]) is placed on the infant's ankle and monitored  5. All OB nurses wear pink Employee badges - do not give your baby to anyone without proper identification. 6. Never leave the baby alone in the room. 7. The infant should be placed on their back to sleep. on a firm mattress. No toys should be placed in the crib. (safe sleep video offered to view)  8. Never shake the baby (video offered to view)  9. Infant fall prevention - do not sleep with the baby, and place the baby in the crib while ambulating. 8. Mother and Baby Care booklet given to Mother.

## 2021-09-04 NOTE — PROGRESS NOTES
Strip reviewed. Cat 1  Patient now comfortable with epidural     4-5/80/BB, AROM performed with clear fluid. Continue Pitocin. GBS + on abx. Patient to peanut.      Ada Blackburn,

## 2021-09-04 NOTE — PROGRESS NOTES
Assumed care of pt. Report from Carol Sherwood RN. Second dose of PCN hung. Pt feeling contractions and reports increasing discomfort.

## 2021-09-04 NOTE — PROGRESS NOTES
In and out cath for 50 cc of clear yellow urine. SVE 5/100/-1     Pt turned on side. Replaced peanut.

## 2021-09-04 NOTE — PROGRESS NOTES
Ha Torres at bedside at [facility]. ENRIQUETA Zhong at bedside at [address] pt to sitting up on bedside at [facility]. Timeout completed at [facility] with ENRIQUETA DEMARCO and myself at bedside. Test dose given at [facility] 21. Negative reaction. Dose given at [address] [place] [address]. Pt assisted to lying back in left tilt position. See anesthesia record for details. See vital sign flow sheet for BP. Tolerated procedure well.

## 2021-09-04 NOTE — PROGRESS NOTES
SBAR OUT Report: Mother    Verbal report given to Chelsey Hall RN (full name & credentials) on this patient, who is now being transferred to MIU (unit) for routine progression of care. The patient is not wearing a green \"Anesthesia-Duramorph\" band. Report consisted of patient's Situation, Background, Assessment and Recommendations (SBAR).  ID bands were compared with the identification form, and verified with the patient and receiving nurse. Information from the SBAR, Procedure Summary, Intake/Output and Recent Results and the [place] Report was reviewed with the receiving nurse; opportunity for questions and clarification provided.

## 2021-09-04 NOTE — PROGRESS NOTES
Pt in and out cath for 500 cc of clear yellow urine. Pt unable to stand yet.  Preparing for transfer to MIU

## 2021-09-04 NOTE — PROGRESS NOTES
SBAR IN Report: Mother    Verbal report received from Christina Baires RN on this patient, who is now being transferred from L&D for routine progression of care. The patient is not wearing a green \"Anesthesia-Duramorph\" band. Report consisted of patient's Situation, Background, Assessment and Recommendations (SBAR).  ID bands were compared with the identification form, and verified with the patient and transferring nurse. Information from the SBAR, Kardex, Procedure Summary, Intake/Output, MAR and Recent Results and the [place] Report was reviewed with the transferring nurse; opportunity for questions and clarification provided.

## 2021-09-04 NOTE — PROGRESS NOTES
Shift assessment complete. Denies any bleeding or LOF,reports good fetal movement. Having some contractions. Denies any headache, blurred vision, epigastric pain,SOB, loss of taste or smell ,no cough. POC discussed with patient and significant other. Patient will use call light PRN for any needs. Questions answered and encouraged.

## 2021-09-04 NOTE — ANESTHESIA POSTPROCEDURE EVALUATION
* No procedures listed *. No value filed. Anesthesia Post Evaluation      Multimodal analgesia: multimodal analgesia used between 6 hours prior to anesthesia start to PACU discharge  Patient location during evaluation: PACU  Patient participation: complete - patient participated  Level of consciousness: awake and alert  Pain management: adequate  Airway patency: patent  Anesthetic complications: no  Cardiovascular status: acceptable  Respiratory status: acceptable  Hydration status: acceptable  Comments: The patient was satisfied with her labor epidural and denies any complications. Her lower extremities have returned to baseline neurologically. Post anesthesia nausea and vomiting:  none  Final Post Anesthesia Temperature Assessment:  Normothermia (36.0-37.5 degrees C)      INITIAL Post-op Vital signs:   Vitals Value Taken Time   /80 09/04/21 0657   Temp     Pulse 75 09/04/21 0657   Resp     SpO2     Vitals shown include unvalidated device data.

## 2021-09-04 NOTE — ANESTHESIA PROCEDURE NOTES
Epidural Block    Patient location during procedure: OB  Start time: 9/4/2021 12:19 AM  End time: 9/4/2021 12:38 AM  Reason for block: labor epidural  Staffing  Performed: attending   Anesthesiologist: Azra Giraldo MD  Preanesthetic Checklist  Completed: patient identified, risks and benefits discussed, surgical consent, pre-op evaluation and timeout performed  Block Placement  Patient position: sitting  Prep: ChloraPrep  Sterility prep: cap, drape, gloves, hand and mask  Sedation level: no sedation  Patient monitoring: continuous pulse oximetry and heart rate  Approach: midline  Location: lumbar  Lumbar location: L3-L4  Epidural  Guidance: landmark technique  Needle  Needle type: Tuohy   Needle gauge: 17 G  Needle length: 10 cm  Needle insertion depth: 8 cm  Catheter type: end hole  Catheter size: 19 G  Catheter at skin depth: 14 cm  Catheter securement method: clear occlusive dressing, liquid medical adhesive and surgical tape  Test dose: negative (0030)  Medications Administered  Lidocaine-EPINEPHrine (XYLOCAINE) 1.5 %-1:200,000 Epidural, 4.5 mL  Assessment  Number of attempts: 1  Procedure assessment: patient tolerated procedure well with no immediate complications  Additional Notes  Time out at [address] [place] [address]

## 2021-09-04 NOTE — PROGRESS NOTES
Pt trembling and late decels noted on fht. Pitocin halved and oxygen via rebreather mask @ 10l. SVE 10/100. Dr. Rubi Weeks notified and called to the bedside.

## 2021-09-04 NOTE — PROGRESS NOTES
Patient calls out requesting stadol at this time, rates her pain at an 8. Stadol 1mg given SIVP. Patient instructed to use call light for assistance if she needs to go to bathroom.

## 2021-09-04 NOTE — ROUTINE PROCESS
In and out cath for 100 cc of clear yellow urine. SVE 7/100/-1    Pt suddenly uncomfortable and having chills. Some nausea. Afebrile. Repositioned pt. Dr. Coy Shepard at the bedside dosing pt. Tachysystole noted on strip. Pitocin decreased to 8mu from 16mu. Repositioned pt and pt found some immediate relief.

## 2021-09-04 NOTE — PROGRESS NOTES
In and out cath for 100 cc of clear yellow urine. SVE 7. Pt uncomfortable in right side. Pt repositioned. Dr. Coy Shepard at the bedside.  Dosing patient

## 2021-09-04 NOTE — L&D DELIVERY NOTE
Delivery Summary    Patient: Kvng De La Torre MRN: [id number]  SSN: [id number]    YOB: 1995  Age: 22 y.o. Sex: female       Information for the patient's :  Hans Mc [[id number]]       Labor Events:    Labor: No    Steroids:     Cervical Ripening Date/Time: 2021 9:00 AM   Cervical Ripening Type: Misoprostol   Antibiotics During Labor: Yes   Rupture Identifier:      Rupture Date/Time: 2021 12:55 AM   Rupture Type: AROM   Amniotic Fluid Volume: Large    Amniotic Fluid Description: Clear    Amniotic Fluid Odor: None    Induction: Oxytocin;AROM       Induction Date/Time: 9/3/2021 9:00 AM    Indications for Induction: Hypertension    Augmentation: AROM   Augmentation Date/Time:      Indications for Augmentation:     Labor complications: None       Additional complications:        Delivery Events:  Indications For Episiotomy:     Episiotomy: None   Perineal Laceration(s): 1st   Repaired:     Periurethral Laceration Location: left    Repaired:     Labial Laceration Location:     Repaired:     Sulcal Laceration Location:     Repaired:     Vaginal Laceration Location:     Repaired:     Cervical Laceration Location:     Repaired:     Repair Suture: Vicryl 3-0   Number of Repair Packets:     Estimated Blood Loss (ml): 100ml   Quantitative Blood Loss (ml)                Delivery Date: 2021    Delivery Time: 4:27 AM  Delivery Type:    Sex:  Male    Gestational Age: 37w1d   Delivery Clinician:     Living Status: Living   Delivery Location: L&D            APGARS  One minute Five minutes Ten minutes   Skin color: 1   1        Heart rate: 2   2        Grimace: 2   2        Muscle tone: 0   2        Breathin   2        Totals: 7   9            Presentation:      Position:        Resuscitation Method:  Tactile Stimulation;Suctioning-bulb     Meconium Stained: None      Cord Information:    Complications:    Cord around:    Delayed cord clamping?     Cord clamped date/time: Disposition of Cord Blood:      Blood Gases Sent?:      Placenta:  Date/Time:    Removal:        Appearance:       [place] Measurements:  Birth Weight: 6 lb 2.4 oz (2.79 kg)      Birth Length: 49.5 cm      Head Circumference: 30 cm      Chest Circumference: 31 cm     Abdominal Girth:       Other Providers:    , Obstetrician;Primary Nurse;Primary  Nurse;Nicu Nurse;Neonatologist;Anesthesiologist;Crna;Nurse Practitioner;Midwife;Nursery Nurse           Group B Strep: No results found for: HECTOR Taylor  Information for the patient's :  Luis Hernández [[id number]]     Lab Results   Component Value Date/Time    ABO/Rh(D) AB POSITIVE 2021 04:27 AM    JOSE CRUZ IgG NEG 2021 04:27 AM        Recent Labs     21  0455 21  0454   PCO2CB 62 47   PO2CB 19 26   HCO3I 23.4  --    SO2I 19.0*  --    IBD 5.8 5.4   SPECTI ARTERIAL CORD VENOUS CORD   PHICB 7.19 7.27   IALLEN NOT APPLICABLE NOT APPLICABLE

## 2021-09-04 NOTE — PROGRESS NOTES
Patient requesting IV pain medication states that she is having a lot of cramping and feeling like she needs urinate all the time.

## 2021-09-05 PROCEDURE — 65270000029 HC RM PRIVATE

## 2021-09-05 PROCEDURE — 74011250637 HC RX REV CODE- 250/637: Performed by: OBSTETRICS & GYNECOLOGY

## 2021-09-05 RX ORDER — OXYCODONE HYDROCHLORIDE 5 MG/1
5 TABLET ORAL
Status: DISCONTINUED | OUTPATIENT
Start: 2021-09-05 | End: 2021-09-06 | Stop reason: HOSPADM

## 2021-09-05 RX ORDER — POLYETHYLENE GLYCOL 3350 17 G/17G
17 POWDER, FOR SOLUTION ORAL DAILY
Status: DISCONTINUED | OUTPATIENT
Start: 2021-09-05 | End: 2021-09-06 | Stop reason: HOSPADM

## 2021-09-05 RX ADMIN — NIFEDIPINE 30 MG: 30 TABLET, FILM COATED, EXTENDED RELEASE ORAL at 22:10

## 2021-09-05 RX ADMIN — FUROSEMIDE 20 MG: 20 TABLET ORAL at 07:59

## 2021-09-05 RX ADMIN — OXYCODONE 5 MG: 5 TABLET ORAL at 09:56

## 2021-09-05 RX ADMIN — OXYCODONE 5 MG: 5 TABLET ORAL at 22:10

## 2021-09-05 RX ADMIN — OXYCODONE 5 MG: 5 TABLET ORAL at 16:01

## 2021-09-05 RX ADMIN — IBUPROFEN 600 MG: 600 TABLET, FILM COATED ORAL at 14:40

## 2021-09-05 RX ADMIN — HYDROCORTISONE ACETATE PRAMOXINE HCL: 2.5; 1 CREAM TOPICAL at 14:41

## 2021-09-05 RX ADMIN — NIFEDIPINE 30 MG: 30 TABLET, FILM COATED, EXTENDED RELEASE ORAL at 07:59

## 2021-09-05 RX ADMIN — POLYETHYLENE GLYCOL 3350 17 G: 17 POWDER, FOR SOLUTION ORAL at 09:56

## 2021-09-05 RX ADMIN — HYDROCORTISONE ACETATE PRAMOXINE HCL: 2.5; 1 CREAM TOPICAL at 14:40

## 2021-09-05 RX ADMIN — DOCUSATE SODIUM 100 MG: 100 CAPSULE, LIQUID FILLED ORAL at 16:01

## 2021-09-05 RX ADMIN — DOCUSATE SODIUM 100 MG: 100 CAPSULE, LIQUID FILLED ORAL at 07:59

## 2021-09-05 RX ADMIN — IBUPROFEN 600 MG: 600 TABLET, FILM COATED ORAL at 07:58

## 2021-09-05 NOTE — PROGRESS NOTES
Daily Progress note:    Patient is S/P vaginal delivery at 37 weeks due to IUGR. No complaints today. Lochia < menses. No GI/ issues. No F/C. VITALS  Patient Vitals for the past 24 hrs:   Temp Pulse Resp BP SpO2   21 0407 98 °F (36.7 °C) 85 16 119/66 96 %   21 2329 98.6 °F (37 °C) (!) 101 17 (!) 150/80 98 %   21 2056  100  (!) 152/91    21 2041 98.9 °F (37.2 °C) 100 19  97 %   21 1538 99 °F (37.2 °C) 98 17 129/89 96 %   21 1230 98.1 °F (36.7 °C) 83 19 (!) 137/95 96 %   21 0820 97.9 °F (36.6 °C) 93 20 128/86 99 %        CV - RRR  LUNGS - CTA bilaterally  ABD - soft, approp tend, FF below umbilicus  EXT - tr edema bilaterally          Labs:  No results found for this or any previous visit (from the past 24 hour(s)). PPD #1      Pt is breast feeding. No issues or complaints today. Stable. Routine PP. CHTN: On Procardia q 12 XR, lasix x 5 days postpartum.  Bps normal to mild range      Ada Blackburn DO  7:32 AM  21

## 2021-09-05 NOTE — PROGRESS NOTES
Spoke with Dr. Cely Mcmullen regarding pain management, oxycodone 5 mg po every 6 hours prn, also patient request for miralax, states patient may have miralax 17 gm po once a day, read back

## 2021-09-06 VITALS
OXYGEN SATURATION: 99 % | DIASTOLIC BLOOD PRESSURE: 97 MMHG | SYSTOLIC BLOOD PRESSURE: 146 MMHG | TEMPERATURE: 98.7 F | RESPIRATION RATE: 18 BRPM | HEART RATE: 89 BPM

## 2021-09-06 PROCEDURE — 74011250637 HC RX REV CODE- 250/637: Performed by: OBSTETRICS & GYNECOLOGY

## 2021-09-06 RX ORDER — IBUPROFEN 800 MG/1
800 TABLET ORAL
Qty: 60 TABLET | Refills: 1 | Status: SHIPPED | OUTPATIENT
Start: 2021-09-06 | End: 2022-04-05

## 2021-09-06 RX ORDER — GUAIFENESIN 100 MG/5ML
162 LIQUID (ML) ORAL DAILY
Qty: 120 TABLET | Refills: 3 | Status: SHIPPED | OUTPATIENT
Start: 2021-09-06 | End: 2022-04-05

## 2021-09-06 RX ORDER — OXYCODONE HYDROCHLORIDE 5 MG/1
5 TABLET ORAL
Qty: 12 TABLET | Refills: 0 | Status: SHIPPED | OUTPATIENT
Start: 2021-09-06 | End: 2021-09-09

## 2021-09-06 RX ORDER — FUROSEMIDE 20 MG/1
20 TABLET ORAL DAILY
Qty: 3 TABLET | Refills: 0 | Status: SHIPPED | OUTPATIENT
Start: 2021-09-06 | End: 2022-04-05

## 2021-09-06 RX ORDER — NIFEDIPINE 30 MG/1
30 TABLET, EXTENDED RELEASE ORAL EVERY 12 HOURS
Qty: 60 TABLET | Refills: 1 | Status: SHIPPED | OUTPATIENT
Start: 2021-09-06 | End: 2022-05-17 | Stop reason: SDUPTHER

## 2021-09-06 RX ADMIN — POLYETHYLENE GLYCOL 3350 17 G: 17 POWDER, FOR SOLUTION ORAL at 07:43

## 2021-09-06 RX ADMIN — DOCUSATE SODIUM 100 MG: 100 CAPSULE, LIQUID FILLED ORAL at 07:39

## 2021-09-06 RX ADMIN — OXYCODONE 5 MG: 5 TABLET ORAL at 07:43

## 2021-09-06 RX ADMIN — FUROSEMIDE 20 MG: 20 TABLET ORAL at 07:39

## 2021-09-06 RX ADMIN — NIFEDIPINE 30 MG: 30 TABLET, FILM COATED, EXTENDED RELEASE ORAL at 07:40

## 2021-09-06 NOTE — PROGRESS NOTES
Post-Partum Day Number 2 Progress/Discharge Note    Patient doing well post-partum without significant complaint. Voiding without difficulty, normal lochia, positive flatus. Vitals:  Patient Vitals for the past 8 hrs:   BP Temp Pulse Resp SpO2   21 1049 (!) 146/97 98.7 °F (37.1 °C) 89 18 99 %   21 0722 (!) 142/101 98.7 °F (37.1 °C) 83 18 99 %     Temp (24hrs), Av.4 °F (36.9 °C), Min:97.7 °F (36.5 °C), Max:98.7 °F (37.1 °C)      Vital signs stable, afebrile. Exam:  Patient without distress. Abdomen soft, fundus firm at level of umbilicus, non tender              Lower extremities are negative for swelling, cords or tenderness. Lab/Data Review: All lab results for the last 24 hours reviewed. Assessment and Plan:  Patient appears to be doing well postpartum. Discussed plan for 2 aspirin daily (162mg) given her BMI, CHTN & hx of recent COVID. Continue routine perineal care and maternal education. Plan discharge for today with follow up in our office in 2 weeks. BP mild range this morning, pt on procardia 30mg XL BID and precautions were reviewed w/ indications for FU.

## 2021-09-06 NOTE — PROGRESS NOTES
Shift assessment complete as noted. Patient states had Tdap vaccine in 2018 Questions encouraged and answered. Encouraged to call for needs or concerns. Verbalizes understanding.

## 2021-09-06 NOTE — PROGRESS NOTES
Initial assessment completed by Rasheed Fuller.     This  will continue to follow thru d/c.    Cullen Marin 20   [phone]

## 2021-09-06 NOTE — DISCHARGE INSTRUCTIONS
Vaginal Childbirth: What To Expect At Home    Your Recovery: Your body will slowly heal in the next few weeks. It is easy to get too tired and overwhelmed during the first weeks after your baby is born. Changes in your hormones can shift your mood without warning. You may find it hard to meet the extra demands on your energy and time. Take it easy on yourself. Follow-up care is a key part of your treatment and safety. Be sure to make and go to all appointments, and call your doctor if you are having problems. It's also a good idea to know your test results and keep a list of the medicines you take. How can you care for yourself at home? Vaginal bleeding and cramps  · After delivery, you will have a bloody discharge from the vagina. This will turn pink within a week and then white or yellow after about 10 days. It may last for 2 to 4 weeks or longer, until the uterus has healed. Use pads instead of tampons until you stop bleeding. · Do not worry if you pass some blood clots, as long as they are smaller than a golf ball. If you have a tear or stitches in your vaginal area, change the pad at least every 4 hours to prevent soreness and infection. · You may have cramps for the first few days after childbirth. These are normal and occur as the uterus shrinks to normal size. Take an over-the-counter pain medicine, such as acetaminophen (Tylenol), ibuprofen (Advil, Motrin), or naproxen (Aleve), for cramps. Read and follow all instructions on the label. Do not take aspirin, because it can cause more bleeding. Do not take acetaminophen (Tylenol) and other acetaminophen containing medications (i.e. Percocet) at the same time. Breast fullness  · Your breasts may overfill (engorge) in the first few days after delivery. To help milk flow and to relieve pain, warm your breasts in the shower or by using warm, moist towels before nursing.   · If you are not nursing, do not put warmth on your breasts or touch your breasts. Wear a tight bra or sports bra and use ice until the fullness goes away. This usually takes 2 to 3 days. · Put ice or a cold pack on your breast after nursing to reduce swelling and pain. Put a thin cloth between the ice and your skin. Activity  · Eat a balanced diet. Do not try to lose weight by cutting calories. Keep taking your prenatal vitamins, or take a multivitamin. · Get as much rest as you can. Try to take naps when your baby sleeps during the day. · Get some exercise every day. But do not do any heavy exercise until your doctor says it is okay. · Wait until you are healed (about 4 to 6 weeks) before you have sexual intercourse. Your doctor will tell you when it is okay to have sex. · Talk to your doctor about birth control. You can get pregnant even before your period returns. Also, you can get pregnant while you are breast-feeding. Mental Health  · Many women get the \"baby blues\" during the first few days after childbirth. You may lose sleep, feel irritable, and cry easily. You may feel happy one minute and sad the next. Hormone changes are one cause of these emotional changes. Also, the demands of a new baby, along with visits from relatives or other family needs, add to a mother's stress. The \"baby blues\" often peak around the fourth day. Then they ease up in less than 2 weeks. · If your moodiness or anxiety lasts for more than 2 weeks, or if you feel like life is not worth living, you may have postpartum depression. This is different for each mother. Some mothers with serious depression may worry intensely about their infant's well-being. Others may feel distant from their child. Some mothers might even feel that they might harm their baby. A mother may have signs of paranoia, wondering if someone is watching her. · With all the changes in your life, you may not know if you are depressed.  Pregnancy sometimes causes changes in how you feel that are similar to the symptoms of depression. · Symptoms of depression include:  · Feeling sad or hopeless and losing interest in daily activities. These are the most common symptoms of depression. · Sleeping too much or not enough. · Feeling tired. You may feel as if you have no energy. · Eating too much or too little. · [employer] ([employer]) offers a Warm line; Chat with the Expert phone sessions; Information and Articles about Pregnancy and Postpartum Mood Disorders; Comprehensive List of Free Support Groups; Knowledgeable local coordinators who will offer support, information, and resources; Guide to Resources on [employer] [place]; Calendar of events in the  mood disorders community; Latest News and Research; and [facility] [address] for [employer]. Remember - You are not alone; You are not to blame; With help, you will be well. 0-930-086-PPD(8304). WWW. [employer]. NET   · Writing or talking about death, such as writing suicide notes or talking about guns, knives, or pills. Keep the numbers for these national suicide hotlines: 9-671-748-TALK (4-484.505.7929) and 9-687-ETRLMZX (5-233.633.1890). If you or someone you know talks about suicide or feeling hopeless, get help right away. Constipation and Hemorrhoids  · Drink plenty of fluids, enough so that your urine is light yellow or clear like water. If you have kidney, heart, or liver disease and have to limit fluids, talk with your doctor before you increase the amount of fluids you drink. · Eat plenty of fiber each day. Have a bran muffin or bran cereal for breakfast, and try eating a piece of fruit for a mid-afternoon snack. · For painful, itchy hemorrhoids, put ice or a cold pack on the area several times a day for 10 minutes at a time. Follow this by putting a warm compress on the area for another 10 to 20 minutes or by sitting in a shallow, warm bath. When should you call for help? Call 911 anytime you think you may need emergency care.  For example, call if:  · You are thinking of hurting yourself, your baby, or anyone else. · You passed out (lost consciousness). · You have symptoms of a blood clot in your lung (called a pulmonary embolism). These may include:    · Sudden chest pain. · Trouble breathing. · Coughing up blood. Call your doctor now or seek immediate medical care if:  · You have severe vaginal bleeding. · You are soaking through a pad each hour for 2 or more hours. · Your vaginal bleeding seems to be getting heavier or is still bright red 4 days after delivery. · You are dizzy or lightheaded, or you feel like you may faint. · You are vomiting or cannot keep fluids down. · You have a fever. · You have new or more belly pain. · You pass tissue (not just blood). · Your vaginal discharge smells bad. · Your belly feels tender or full and hard. · Your breasts are continuously painful or red. · You feel sad, anxious, or hopeless for more than a few days. · You have sudden, severe pain in your belly. · You have symptoms of a blood clot in your leg (called a deep vein thrombosis),          such as:  · Pain in your calf, back of the knee, thigh, or groin. · Redness and swelling in your leg or groin. · You have symptoms of preeclampsia, such as:  · Sudden swelling of your face, hands, or feet. · New vision problems (such as dimness or blurring). · A severe headache. · Your blood pressure is higher than it should be or rises suddenly. · You have new nausea or vomiting. Watch closely for changes in your health, and be sure to contact your doctor if you have any problems.               DISCHARGE SUMMARY from Nurse    PATIENT INSTRUCTIONS:    After general anesthesia or intravenous sedation, for 24 hours or while taking prescription Narcotics:  · Limit your activities  · Do not drive and operate hazardous machinery  · Do not make important personal or business decisions  · Do  not drink alcoholic beverages  · If you have not urinated within 8 hours after discharge, please contact your surgeon on call. Report the following to your surgeon:  · Excessive pain, swelling, redness or odor of or around the surgical area  · Temperature over 100.5  · Nausea and vomiting lasting longer than 4 hours or if unable to take medications  · Any signs of decreased circulation or nerve impairment to extremity: change in color, persistent  numbness, tingling, coldness or increase pain  · Any questions    What to do at Home:    Nothing in the vagina for 6 weeks. Call MD if experiencing heavy vaginal bleeding greater than 1 pad per hour, temperature over 100.4, foul smelling vaginal discharge, thoughts of suicide or homicide, symptoms of postpartum depression or mastitis, or any other major medical concerns. *  Please give a list of your current medications to your Primary Care Provider. *  Please update this list whenever your medications are discontinued, doses are      changed, or new medications (including over-the-counter products) are added. *  Please carry medication information at all times in case of emergency situations. These are general instructions for a healthy lifestyle:    No smoking/ No tobacco products/ Avoid exposure to second hand smoke  Surgeon General's Warning:  Quitting smoking now greatly reduces serious risk to your health. Obesity, smoking, and sedentary lifestyle greatly increases your risk for illness    A healthy diet, regular physical exercise & weight monitoring are important for maintaining a healthy lifestyle    You may be retaining fluid if you have a history of heart failure or if you experience any of the following symptoms:  Weight gain of 3 pounds or more overnight or 5 pounds in a week, increased swelling in our hands or feet or shortness of breath while lying flat in bed. Please call your doctor as soon as you notice any of these symptoms; do not wait until your next office visit.         The discharge information has been reviewed with the patient. The patient verbalized understanding. Discharge medications reviewed with the patient and appropriate educational materials and side effects teaching were provided.   ___________________________________________________________________________________________________________________________________

## 2021-09-27 NOTE — DISCHARGE SUMMARY
Obstetrical Discharge Summary     Name: Elliot Ma MRN: [id number]  SSN: [id number]    YOB: 1995  Age: 22 y.o. Sex: female      Allergies: Patient has no known allergies. Admit Date: 9/3/2021    Discharge Date: 2021     Admitting Physician: Diana Kaur DO     Attending Physician:  No att. providers found     * Admission Diagnoses: Encounter for induction of labor [Z34.90]    * Discharge Diagnoses:   Information for the patient's :  Sheryle Dade [[id number]]   Delivery of a 6 lb 2.4 oz (2.79 kg) male infant via Vaginal, Spontaneous on 2021 at 4:27 AM  by Diana Kaur. Apgars were 7  and 9 . Additional Diagnoses:   Hospital Problems as of 2021 Date Reviewed: 2021        Codes Class Noted - Resolved POA    Encounter for induction of labor ICD-10-CM: Z34.90  ICD-9-CM: V22.1  9/3/2021 - Present Unknown        Positive GBS test ICD-10-CM: B95.1  ICD-9-CM: 041.02  2021 - Present Yes        COVID-19 affecting pregnancy in third trimester ICD-10-CM: O98.513, U07.1  ICD-9-CM: 647.63, 079.89  2021 - Present Yes    Overview Signed 2021  3:21 PM by Nadir Dee RN     2021 at [facility]: 8/3/21 +Covid-was at [facility] 8/3-. Pregnancy affected by fetal growth restriction ICD-10-CM: O36.5990  ICD-9-CM: 656.50  2021 - Present Yes    Overview Signed 2021  3:24 PM by Nadir Dee RN     SEE HRP OVERVIEW             Cervical shortening affecting pregnancy in third trimester ICD-10-CM: O26.873  ICD-9-CM: 649.73  2021 - Present Yes    Overview Addendum 2021  8:51 AM by Behzad Gallagher MD     2021 at [facility]: CL 2.1 cm  2021 [facility]: CL 1.8 cm. Cerclage placed.   2021 [facility]: Reassuring post-cerclage cervix CL 3.1cm.  [facility]: CL 3.6 cm, cerclage intact                Obesity affecting pregnancy in third trimester ICD-10-CM: O99.213  ICD-9-CM: 649.13  3/11/2021 - Present Yes    Overview Addendum 6/18/2021 10:01 AM by Jerry Alexis MD     - BMI > 45 at conception. Initial A1c = 5.6    4/12/21 Passed 2 hr GTT  5/11/2021 at [facility]: Repeat 2hr GTT at 28wk; If any abnormal values, begin therapy for gestational DM. * (Principal) Pre-existing hypertension during pregnancy in third trimester ICD-10-CM: O10.913  ICD-9-CM: 642.03  3/11/2021 - Present Yes    Overview Addendum 8/24/2021  5:03 PM by Avani Gaming MD     - on Norvasc at conception, changed to labetalol 100mg BID  4/12/21 baseline 24h urine / labs = 155mg   - initial ALT = 40  - start LDA @ 12 weeks  Mild range elevated BPs noted    5/26/2021 at [facility]: 154/92, will send in Procardia XR 30 mg BID, continue Labetalol 200 mg (q8h) until PA approved. 6/17/2021 at [facility]: 142/98, 135/92 on L Arm; 123/85 R Arm, will continue Procardia XR 30 mg BID at this time. Pt states that she has had different BP in both arms in the past.  7/26/2021 at [facility]: 132/78   8/17/2021 at [facility]: 138/76   8/24/2021 at [facility]: BP normal today, no PreE symptoms. Taking Procardia XL 30mg BID  · CHECK BP'S in LEFT ARM              High-risk pregnancy in third trimester ICD-10-CM: O09.93  ICD-9-CM: V23.9  3/11/2021 - Present Yes    Overview Addendum 8/24/2021  5:07 PM by Avani Gaming MD     - referred to M   5/11/2021 at [facility]: Normal anatomy and echo; AC 18%, CL 2.1 cm, KALLI WNL, Dopplers WNL  5/26/2021 at [facility]: CL 3.1 cm, KALLI generous, cerclage intact, BP elevated - Procardia XR 30 mg BID prescribed today  6/17/2021 at [facility]: Appropriate growth, AC 22%, KALLI 23 cm, CL 3.6 cm cerclage intact. 7/26/2021 at [facility]: New finding of FGR, reassuring fetal status, BPP 8/8.    8/17/2021 at [facility]: FGR, AC 3%, Overall 14%, KALLI 17cm, BPP 8/8.    8/24/2021 at [facility]: Severe FGR, Recent severe COVID Pneumonia, in ICU at [facility] for 10 days. Reports active fetus. BPP=8/8. KALLI=14.4 cm. Explained plan for induction at 37 weeks due to severe FGR.  CC Mail sent to [facility].    · Twice weekly testing with primary OB. ·  Cerclage removal at 37 weeks at induction time                  Lab Results   Component Value Date/Time    ABO/Rh(D) B POSITIVE 2021 06:27 AM      Immunization History   Administered Date(s) Administered    Hep B Immune Globulin 03/10/2020    Influenza Vaccine 2019, 2020    Influenza Vaccine (Quad) Mdck Pf (>4 Yrs Flucelvax QUAD 46722) 10/26/2020    TB Skin Test (PPD) Intradermal 2013, 2013, 2013, 10/28/2020       * Procedures:   * No surgery found *      Retired Use Flowsheet (6) [place]  Depression Scale  I have been able to laugh and see the funny side of things: As much as I always could  I have looked forward with enjoyment to things: As much as I ever did  I have blamed myself unnecessarily when things went wrong: No, never  I have been anxious or worried for no good reason: Hardly ever  I have felt scared or panicky for no very good reason: No, not much  Things have been getting on top of me: No, I have been coping as well as ever  I have been so unhappy that I have had difficulty sleeping: No, not at all  I have felt sad or miserable: No, not at all  I have been so unhappy that I have been crying: No, never  The thought of harming myself has occurred to me: Never  Total Score: 2    * Discharge Condition: good    * Hospital Course: Normal hospital course following the delivery. * Disposition: Home    Discharge Medications:   Discharge Medication List as of 2021 11:01 AM      START taking these medications    Details   furosemide (LASIX) 20 mg tablet Take 1 Tablet by mouth daily. , Normal, Disp-3 Tablet, R-0      ibuprofen (MOTRIN) 800 mg tablet Take 1 Tablet by mouth every six (6) hours as needed for Pain., Normal, Disp-60 Tablet, R-1      oxyCODONE IR (ROXICODONE) 5 mg immediate release tablet Take 1 Tablet by mouth every six (6) hours as needed for Pain for up to 3 days.  Max Daily Amount: 20 mg., Normal, Disp-12 Tablet, R-0      aspirin 81 mg chewable tablet Take 2 Tablets by mouth daily. , Normal, Disp-120 Tablet, R-3      !! NIFEdipine ER (PROCARDIA XL) 30 mg ER tablet Take 1 Tablet by mouth every twelve (12) hours every twelve (12) hours. , Normal, Disp-60 Tablet, R-1       !! - Potential duplicate medications found. Please discuss with provider. CONTINUE these medications which have NOT CHANGED    Details   !! NIFEdipine ER (Procardia XL) 30 mg ER tablet Take 1 Tablet by mouth every twelve (12) hours. Indications: pre-existing hypertension during pregnancy, Normal, Disp-60 Tablet, R-5      docusate sodium (COLACE) 100 mg capsule Take 100 mg by mouth two (2) times a day., Historical Med      acetaminophen (Tylenol Extra Strength) 500 mg tablet Take 500 mg by mouth every six (6) hours as needed for Pain., Historical Med      cholecalciferol (VITAMIN D3) (2,000 UNITS /50 MCG) cap capsule Take 1 Cap by mouth daily. Indications: low vitamin D levels, Normal, Disp-30 Cap, R-5      ferrous sulfate (IRON PO) Take  by mouth., Historical Med      prenatal 168/iron/folic/omega3 (ONE-A-DAY PRENATAL-1 PO) Take  by mouth., Historical Med      famotidine (PEPCID) 20 mg tablet Take 1 Tablet by mouth two (2) times a day. Indications: gastroesophageal reflux disease, Normal, Disp-60 Tablet, R-1       !! - Potential duplicate medications found. Please discuss with provider. STOP taking these medications       progesterone (PROMETRIUM) 200 mg capsule Comments:   Reason for Stopping:               * Follow-up Care/Patient Instructions: Activity: Activity as tolerated and No sex for 6 weeks  Diet: Regular Diet  Wound Care: None needed    Follow-up Information     Follow up With Specialties Details Why Contact Info    Nic Madera MD Internal Medicine   [facility]  [place] [phone]                        .

## 2022-03-18 PROBLEM — O99.340 ANXIETY DURING PREGNANCY: Status: ACTIVE | Noted: 2019-05-17

## 2022-03-18 PROBLEM — F41.9 ANXIETY DURING PREGNANCY: Status: ACTIVE | Noted: 2019-05-17

## 2022-03-18 PROBLEM — Z3A.36 36 WEEKS GESTATION OF PREGNANCY: Status: ACTIVE | Noted: 2021-08-24

## 2022-03-18 PROBLEM — B95.1 POSITIVE GBS TEST: Status: ACTIVE | Noted: 2021-08-27

## 2022-03-19 PROBLEM — O36.5990 PREGNANCY AFFECTED BY FETAL GROWTH RESTRICTION: Status: ACTIVE | Noted: 2021-07-26

## 2022-03-19 PROBLEM — O09.93 HIGH-RISK PREGNANCY IN THIRD TRIMESTER: Status: ACTIVE | Noted: 2021-03-11

## 2022-03-19 PROBLEM — U07.1 COVID-19 AFFECTING PREGNANCY IN THIRD TRIMESTER: Status: ACTIVE | Noted: 2021-08-17

## 2022-03-19 PROBLEM — O26.873 CERVICAL SHORTENING AFFECTING PREGNANCY IN THIRD TRIMESTER: Status: ACTIVE | Noted: 2021-05-11

## 2022-03-19 PROBLEM — O99.213 OBESITY AFFECTING PREGNANCY IN THIRD TRIMESTER: Status: ACTIVE | Noted: 2021-03-11

## 2022-03-19 PROBLEM — A74.9 CHLAMYDIA INFECTION AFFECTING PREGNANCY IN FIRST TRIMESTER: Status: ACTIVE | Noted: 2021-03-16

## 2022-03-19 PROBLEM — O98.811 CHLAMYDIA INFECTION AFFECTING PREGNANCY IN FIRST TRIMESTER: Status: ACTIVE | Noted: 2021-03-16

## 2022-03-19 PROBLEM — O10.913 PRE-EXISTING HYPERTENSION DURING PREGNANCY IN THIRD TRIMESTER: Status: ACTIVE | Noted: 2021-03-11

## 2022-03-19 PROBLEM — O98.513 COVID-19 AFFECTING PREGNANCY IN THIRD TRIMESTER: Status: ACTIVE | Noted: 2021-08-17

## 2022-03-20 PROBLEM — Z34.90 ENCOUNTER FOR INDUCTION OF LABOR: Status: ACTIVE | Noted: 2021-09-03

## 2022-04-20 PROBLEM — O98.513 COVID-19 AFFECTING PREGNANCY IN THIRD TRIMESTER: Status: RESOLVED | Noted: 2021-08-17 | Resolved: 2022-04-20

## 2022-04-20 PROBLEM — O28.5 ABNORMAL CHROMOSOMAL AND GENETIC FINDING ON ANTENATAL SCREENING MOTHER: Status: ACTIVE | Noted: 2022-04-20

## 2022-04-20 PROBLEM — Z34.90 ENCOUNTER FOR INDUCTION OF LABOR: Status: RESOLVED | Noted: 2021-09-03 | Resolved: 2022-04-20

## 2022-04-20 PROBLEM — O09.891 SHORT INTERVAL BETWEEN PREGNANCIES AFFECTING PREGNANCY IN FIRST TRIMESTER, ANTEPARTUM: Status: ACTIVE | Noted: 2022-04-20

## 2022-04-20 PROBLEM — Z86.19 HISTORY OF CHLAMYDIA: Status: ACTIVE | Noted: 2021-03-16

## 2022-04-20 PROBLEM — O36.5990 PREGNANCY AFFECTED BY FETAL GROWTH RESTRICTION: Status: RESOLVED | Noted: 2021-07-26 | Resolved: 2022-04-20

## 2022-04-20 PROBLEM — B95.1 POSITIVE GBS TEST: Status: RESOLVED | Noted: 2021-08-27 | Resolved: 2022-04-20

## 2022-04-20 PROBLEM — Z3A.36 36 WEEKS GESTATION OF PREGNANCY: Status: RESOLVED | Noted: 2021-08-24 | Resolved: 2022-04-20

## 2022-04-20 PROBLEM — U07.1 COVID-19 AFFECTING PREGNANCY IN THIRD TRIMESTER: Status: RESOLVED | Noted: 2021-08-17 | Resolved: 2022-04-20

## 2022-05-10 PROBLEM — O09.291 HISTORY OF CERCLAGE, CURRENTLY PREGNANT, FIRST TRIMESTER: Status: ACTIVE | Noted: 2021-05-11

## 2022-05-10 PROBLEM — Z14.8 CARRIER OF SPINAL MUSCULAR ATROPHY: Status: ACTIVE | Noted: 2022-04-20

## 2022-05-10 PROBLEM — O10.911 PRE-EXISTING HYPERTENSION DURING PREGNANCY IN FIRST TRIMESTER: Status: ACTIVE | Noted: 2021-03-11

## 2022-05-10 PROBLEM — Z98.890 HISTORY OF CERCLAGE, CURRENTLY PREGNANT, FIRST TRIMESTER: Status: ACTIVE | Noted: 2021-05-11

## 2022-05-10 PROBLEM — O99.211 OBESITY AFFECTING PREGNANCY IN FIRST TRIMESTER: Status: ACTIVE | Noted: 2021-03-11

## 2022-05-10 PROBLEM — O09.91 HIGH-RISK PREGNANCY IN FIRST TRIMESTER: Status: ACTIVE | Noted: 2021-03-11

## 2022-05-18 VITALS — DIASTOLIC BLOOD PRESSURE: 82 MMHG | WEIGHT: 293 LBS | SYSTOLIC BLOOD PRESSURE: 128 MMHG | BODY MASS INDEX: 46.22 KG/M2

## 2022-06-06 PROBLEM — O09.892 SHORT INTERVAL BETWEEN PREGNANCIES AFFECTING PREGNANCY IN SECOND TRIMESTER, ANTEPARTUM: Status: ACTIVE | Noted: 2022-04-20

## 2022-06-06 PROBLEM — O99.212 OBESITY AFFECTING PREGNANCY IN SECOND TRIMESTER: Status: ACTIVE | Noted: 2021-03-11

## 2022-06-06 PROBLEM — O10.912 PRE-EXISTING HYPERTENSION DURING PREGNANCY IN SECOND TRIMESTER: Status: ACTIVE | Noted: 2021-03-11

## 2022-06-06 PROBLEM — O09.292 HISTORY OF CERCLAGE, CURRENTLY PREGNANT, SECOND TRIMESTER: Status: ACTIVE | Noted: 2021-05-11

## 2022-06-06 PROBLEM — O09.92 HIGH-RISK PREGNANCY IN SECOND TRIMESTER: Status: ACTIVE | Noted: 2021-03-11

## 2022-06-06 PROBLEM — Z98.890 HISTORY OF CERCLAGE, CURRENTLY PREGNANT, SECOND TRIMESTER: Status: ACTIVE | Noted: 2021-05-11

## 2022-06-19 PROBLEM — Z14.8 CARRIER OF SPINAL MUSCULAR ATROPHY: Status: ACTIVE | Noted: 2022-04-20

## 2022-06-20 ENCOUNTER — ROUTINE PRENATAL (OUTPATIENT)
Dept: OBGYN CLINIC | Age: 27
End: 2022-06-20

## 2022-06-20 DIAGNOSIS — O99.340 ANXIETY DURING PREGNANCY: ICD-10-CM

## 2022-06-20 DIAGNOSIS — Z98.890 HISTORY OF CERCLAGE, CURRENTLY PREGNANT, SECOND TRIMESTER: ICD-10-CM

## 2022-06-20 DIAGNOSIS — Z14.8 CARRIER OF SPINAL MUSCULAR ATROPHY: ICD-10-CM

## 2022-06-20 DIAGNOSIS — O09.292 HISTORY OF CERCLAGE, CURRENTLY PREGNANT, SECOND TRIMESTER: ICD-10-CM

## 2022-06-20 DIAGNOSIS — O10.012 PRE-EXISTING ESSENTIAL HYPERTENSION DURING PREGNANCY IN SECOND TRIMESTER: ICD-10-CM

## 2022-06-20 DIAGNOSIS — O09.92 HIGH-RISK PREGNANCY IN SECOND TRIMESTER: Primary | ICD-10-CM

## 2022-06-20 DIAGNOSIS — Z3A.17 17 WEEKS GESTATION OF PREGNANCY: ICD-10-CM

## 2022-06-20 DIAGNOSIS — F41.9 ANXIETY DURING PREGNANCY: ICD-10-CM

## 2022-06-20 DIAGNOSIS — Z86.19 HISTORY OF CHLAMYDIA: ICD-10-CM

## 2022-06-20 DIAGNOSIS — O99.212 OBESITY AFFECTING PREGNANCY IN SECOND TRIMESTER: ICD-10-CM

## 2022-06-20 DIAGNOSIS — O09.892 SHORT INTERVAL BETWEEN PREGNANCIES AFFECTING PREGNANCY IN SECOND TRIMESTER, ANTEPARTUM: ICD-10-CM

## 2022-06-20 ASSESSMENT — PATIENT HEALTH QUESTIONNAIRE - PHQ9
SUM OF ALL RESPONSES TO PHQ QUESTIONS 1-9: 0
SUM OF ALL RESPONSES TO PHQ9 QUESTIONS 1 & 2: 0
2. FEELING DOWN, DEPRESSED OR HOPELESS: 0
SUM OF ALL RESPONSES TO PHQ QUESTIONS 1-9: 0
1. LITTLE INTEREST OR PLEASURE IN DOING THINGS: 0

## 2022-06-20 NOTE — PROGRESS NOTES
[facility] Consultation    Neisha Workman (: 1995) is a 32 y.o. [employer] [place] [address] with 2022, by Ultrasound. Presents for evaluation of the following chief complaint(s):  Ultrasound and High Risk Pregnancy (Short interval preg, SMA carrier, H/O cerclage, CHTN)     Previous [facility] pt from  seen for Cervical Insufficiency & cerclage placement,  BMI >45, CHTN, Cerclage in situ, and Covid. Patient is not working outside of home. Scheduled to see Primary OB [facility] ) on 2022. No HAs, edema. No bleeding, LOF, cramping, ctxs, or vaginal pressure. Interval history reviewed. Review of Systems - per HPI; otherwise unremarkable. Exam:   There were no vitals filed for this visit. Ultrasound: Please see formal ultrasound report under imaging tab. ASSESSMENT/PLAN:  Patient Active Problem List    Diagnosis Date Noted    High-risk pregnancy in second trimester 2021     Priority: High     Dated by 9 week US  G1  at 40 weeks due to IUGR, hx of CI - Delivered 2021  Asa 162 mg qhs   Referral to [facility]  5/10/2022 at [facility]: Normal NT 1.5 mm, nasal bone seen, TV CL 4.95 cm. Neg NIPT, carrier screen positive for SMA carrier.  Obesity affecting pregnancy in second trimester 2021     Priority: High     - BMI > 45 at conception. Initial A1c = 5.5  - anatomy scan with [facility]   - Early 2 hr __  BMI on IPV 47  Discussed w/ patient that BMI cut off for delivery at [facility] is 50  We discussed expected weight gain during the pregnancy and diet/excersise   5/10/2022 at [facility]:   · if a1c > 5.5, then follow up with either \"2 step\" 1hr GCT/ 3hr GTT OR \"1   step\" 2hr GTT        Short interval between pregnancies affecting pregnancy in second trimester, antepartum 2022    Carrier of spinal muscular atrophy 2022     5/10/2022 at [facility]: positive carrier screen. Genetic counseling done by MD today.    · Recommend FOB get SMA screening  · Make pediatrician aware ([facility])        History of cerclage, currently pregnant, second trimester 05/11/2021     G1 with shortened cervix and cerclage placement   Induced at 37 weeks due to IUGR        History of chlamydia 03/16/2021     GC/C pending         Pre-existing hypertension during pregnancy in second trimester 03/11/2021     Previously on Norvasc prior to first pregnancy   Current regimen:  Procardia 30 BID  Reports 160 BP recently at urgent care. /86 on IPV  Baseline HELLP with ALT of 42 and PC labs and 24 hour urine. Repeat labs   5/18 with normal LFTs. Hepatitis panel negative   5/10/2022 [facility] BP reassuring today.  Anxiety during pregnancy 05/17/2019     5/10/2022 at [facility]: Stable mood, no meds. Addressed normal pregnancy complaints, reassured and offered suggestions for care  Reviewed gestational age precautions and activity goals/limitations  Nutritional counseling as well as specific goals based on current maternal and fetal status  Options for GERD therapy if becomes symptomatic over course of pregnancy  Gestational age appropriate preventive care regarding communicable disease transmission and vaccines as appropriate (including flu, TDaP, and COVID.)  Additional plans and concerns as documented in problem list.   All questions answered and concerns discussed. An electronic signature was used to authenticate this note.   Aury Villarreal MD    I have spent *** minutes reviewing previous notes, test results and face to face with the patient discussing the diagnosis and importance of compliance with the treatment plan, in addition to ultrasound findings, as well as documenting on the day of the visit (6/20/2022)    {[facility] Follow [id number]}      Patient Active Problem List   Diagnosis Code    Anxiety during pregnancy O99.340, F41.9    History of chlamydia Z86.19    Short interval between pregnancies affecting pregnancy in second trimester, antepartum O09.892    High-risk pregnancy in second trimester O09.92    Obesity affecting pregnancy in second trimester O99.212    Pre-existing hypertension during pregnancy in second trimester O10.912    History of cerclage, currently pregnant, second trimester O09.292, Z98.890    Carrier of spinal muscular atrophy Z14.8

## 2022-06-30 RX ORDER — NIFEDIPINE 30 MG/1
TABLET, EXTENDED RELEASE ORAL
Qty: 60 TABLET | Refills: 1 | OUTPATIENT
Start: 2022-06-30

## 2022-11-11 NOTE — PROGRESS NOTES
Care Management Interventions  PCP Verified by CM: Yes  Current Support Network: Other  Discharge Location  Discharge Placement: Home with family assistance  Discussed and provided patient with  informational packet on  mood and anxiety disorders (resources/education).  provided education on [facility] Visit and provided brochure. At this time [facility] is providing the service telephonically due to COVID precautions. Patient declined to participate in the program at this time. Pt denies any symptoms of anxiety/depression. Has all needs and support at home. Enbrel Counseling:  I discussed with the patient the risks of etanercept including but not limited to myelosuppression, immunosuppression, autoimmune hepatitis, demyelinating diseases, lymphoma, and infections.  The patient understands that monitoring is required including a PPD at baseline and must alert us or the primary physician if symptoms of infection or other concerning signs are noted. Taltz Counseling: I discussed with the patient the risks of ixekizumab including but not limited to immunosuppression, serious infections, worsening of inflammatory bowel disease and drug reactions.  The patient understands that monitoring is required including a PPD at baseline and must alert us or the primary physician if symptoms of infection or other concerning signs are noted. Sarecycline Counseling: Patient advised regarding possible photosensitivity and discoloration of the teeth, skin, lips, tongue and gums.  Patient instructed to avoid sunlight, if possible.  When exposed to sunlight, patients should wear protective clothing, sunglasses, and sunscreen.  The patient was instructed to call the office immediately if the following severe adverse effects occur:  hearing changes, easy bruising/bleeding, severe headache, or vision changes.  The patient verbalized understanding of the proper use and possible adverse effects of sarecycline.  All of the patient's questions and concerns were addressed. Topical Sulfur Applications Counseling: Topical Sulfur Counseling: Patient counseled that this medication may cause skin irritation or allergic reactions.  In the event of skin irritation, the patient was advised to reduce the amount of the drug applied or use it less frequently.   The patient verbalized understanding of the proper use and possible adverse effects of topical sulfur application.  All of the patient's questions and concerns were addressed. Doxepin Counseling:  Patient advised that the medication is sedating and not to drive a car after taking this medication. Patient informed of potential adverse effects including but not limited to dry mouth, urinary retention, and blurry vision.  The patient verbalized understanding of the proper use and possible adverse effects of doxepin.  All of the patient's questions and concerns were addressed. Bactrim Pregnancy And Lactation Text: This medication is Pregnancy Category D and is known to cause fetal risk.  It is also excreted in breast milk. Minoxidil Counseling: Minoxidil is a topical medication which can increase blood flow where it is applied. It is uncertain how this medication increases hair growth. Side effects are uncommon and include stinging and allergic reactions. Bactrim Counseling:  I discussed with the patient the risks of sulfa antibiotics including but not limited to GI upset, allergic reaction, drug rash, diarrhea, dizziness, photosensitivity, and yeast infections.  Rarely, more serious reactions can occur including but not limited to aplastic anemia, agranulocytosis, methemoglobinemia, blood dyscrasias, liver or kidney failure, lung infiltrates or desquamative/blistering drug rashes. Benzoyl Peroxide Counseling: Patient counseled that medicine may cause skin irritation and bleach clothing.  In the event of skin irritation, the patient was advised to reduce the amount of the drug applied or use it less frequently.   The patient verbalized understanding of the proper use and possible adverse effects of benzoyl peroxide.  All of the patient's questions and concerns were addressed. Cyclophosphamide Counseling:  I discussed with the patient the risks of cyclophosphamide including but not limited to hair loss, hormonal abnormalities, decreased fertility, abdominal pain, diarrhea, nausea and vomiting, bone marrow suppression and infection. The patient understands that monitoring is required while taking this medication. Valtrex Pregnancy And Lactation Text: this medication is Pregnancy Category B and is considered safe during pregnancy. This medication is not directly found in breast milk but it's metabolite acyclovir is present. Odomzo Pregnancy And Lactation Text: This medication is Pregnancy Category X and is absolutely contraindicated during pregnancy. It is unknown if it is excreted in breast milk. Dapsone Pregnancy And Lactation Text: This medication is Pregnancy Category C and is not considered safe during pregnancy or breast feeding. Taltz Pregnancy And Lactation Text: The risk during pregnancy and breastfeeding is uncertain with this medication. Otezla Counseling: The side effects of Otezla were discussed with the patient, including but not limited to worsening or new depression, weight loss, diarrhea, nausea, upper respiratory tract infection, and headache. Patient instructed to call the office should any adverse effect occur.  The patient verbalized understanding of the proper use and possible adverse effects of Otezla.  All the patient's questions and concerns were addressed. Use Enhanced Medication Counseling?: No Erivedge Counseling- I discussed with the patient the risks of Erivedge including but not limited to nausea, vomiting, diarrhea, constipation, weight loss, changes in the sense of taste, decreased appetite, muscle spasms, and hair loss.  The patient verbalized understanding of the proper use and possible adverse effects of Erivedge.  All of the patient's questions and concerns were addressed. Sarecycline Pregnancy And Lactation Text: This medication is Pregnancy Category D and not consider safe during pregnancy. It is also excreted in breast milk. Doxepin Pregnancy And Lactation Text: This medication is Pregnancy Category C and it isn't known if it is safe during pregnancy. It is also excreted in breast milk and breast feeding isn't recommended. Topical Sulfur Applications Pregnancy And Lactation Text: This medication is Pregnancy Category C and has an unknown safety profile during pregnancy. It is unknown if this topical medication is excreted in breast milk. Enbrel Pregnancy And Lactation Text: This medication is Pregnancy Category B and is considered safe during pregnancy. It is unknown if this medication is excreted in breast milk. Minoxidil Pregnancy And Lactation Text: This medication has not been assigned a Pregnancy Risk Category but animal studies failed to show danger with the topical medication. It is unknown if the medication is excreted in breast milk. Cyclophosphamide Pregnancy And Lactation Text: This medication is Pregnancy Category D and it isn't considered safe during pregnancy. This medication is excreted in breast milk. Benzoyl Peroxide Pregnancy And Lactation Text: This medication is Pregnancy Category C. It is unknown if benzoyl peroxide is excreted in breast milk. Tetracycline Counseling: Patient counseled regarding possible photosensitivity and increased risk for sunburn.  Patient instructed to avoid sunlight, if possible.  When exposed to sunlight, patients should wear protective clothing, sunglasses, and sunscreen.  The patient was instructed to call the office immediately if the following severe adverse effects occur:  hearing changes, easy bruising/bleeding, severe headache, or vision changes.  The patient verbalized understanding of the proper use and possible adverse effects of tetracycline.  All of the patient's questions and concerns were addressed. Patient understands to avoid pregnancy while on therapy due to potential birth defects. Cyclosporine Pregnancy And Lactation Text: This medication is Pregnancy Category C and it isn't know if it is safe during pregnancy. This medication is excreted in breast milk. Carac Pregnancy And Lactation Text: This medication is Pregnancy Category X and contraindicated in pregnancy and in women who may become pregnant. It is unknown if this medication is excreted in breast milk. Cephalexin Pregnancy And Lactation Text: This medication is Pregnancy Category B and considered safe during pregnancy.  It is also excreted in breast milk but can be used safely for shorter doses. Finasteride Counseling:  I discussed with the patient the risks of use of finasteride including but not limited to decreased libido, decreased ejaculate volume, gynecomastia, and depression. Women should not handle medication.  All of the patient's questions and concerns were addressed. Carac Counseling:  I discussed with the patient the risks of Carac including but not limited to erythema, scaling, itching, weeping, crusting, and pain. Cephalexin Counseling: I counseled the patient regarding use of cephalexin as an antibiotic for prophylactic and/or therapeutic purposes. Cephalexin (commonly prescribed under brand name Keflex) is a cephalosporin antibiotic which is active against numerous classes of bacteria, including most skin bacteria. Side effects may include nausea, diarrhea, gastrointestinal upset, rash, hives, yeast infections, and in rare cases, hepatitis, kidney disease, seizures, fever, confusion, neurologic symptoms, and others. Patients with severe allergies to penicillin medications are cautioned that there is about a 10% incidence of cross-reactivity with cephalosporins. When possible, patients with penicillin allergies should use alternatives to cephalosporins for antibiotic therapy. Mirvaso Counseling: Mirvaso is a topical medication which can decrease superficial blood flow where applied. Side effects are uncommon and include stinging, redness and allergic reactions. Hydroxyzine Counseling: Patient advised that the medication is sedating and not to drive a car after taking this medication.  Patient informed of potential adverse effects including but not limited to dry mouth, urinary retention, and blurry vision.  The patient verbalized understanding of the proper use and possible adverse effects of hydroxyzine.  All of the patient's questions and concerns were addressed. Otezla Pregnancy And Lactation Text: This medication is Pregnancy Category C and it isn't known if it is safe during pregnancy. It is unknown if it is excreted in breast milk. Cyclosporine Counseling:  I discussed with the patient the risks of cyclosporine including but not limited to hypertension, gingival hyperplasia,myelosuppression, immunosuppression, liver damage, kidney damage, neurotoxicity, lymphoma, and serious infections. The patient understands that monitoring is required including baseline blood pressure, CBC, CMP, lipid panel and uric acid, and then 1-2 times monthly CMP and blood pressure. Tremfya Counseling: I discussed with the patient the risks of guselkumab including but not limited to immunosuppression, serious infections, worsening of inflammatory bowel disease and drug reactions.  The patient understands that monitoring is required including a PPD at baseline and must alert us or the primary physician if symptoms of infection or other concerning signs are noted. Wartpeel Counseling:  I discussed with the patient the risks of Wartpeel including but not limited to erythema, scaling, itching, weeping, crusting, and pain. Humira Counseling:  I discussed with the patient the risks of adalimumab including but not limited to myelosuppression, immunosuppression, autoimmune hepatitis, demyelinating diseases, lymphoma, and serious infections.  The patient understands that monitoring is required including a PPD at baseline and must alert us or the primary physician if symptoms of infection or other concerning signs are noted. Methotrexate Counseling:  Patient counseled regarding adverse effects of methotrexate including but not limited to nausea, vomiting, abnormalities in liver function tests. Patients may develop mouth sores, rash, diarrhea, and abnormalities in blood counts. The patient understands that monitoring is required including LFT's and blood counts.  There is a rare possibility of scarring of the liver and lung problems that can occur when taking methotrexate. Persistent nausea, loss of appetite, pale stools, dark urine, cough, and shortness of breath should be reported immediately. Patient advised to discontinue methotrexate treatment at least three months before attempting to become pregnant.  I discussed the need for folate supplements while taking methotrexate.  These supplements can decrease side effects during methotrexate treatment. The patient verbalized understanding of the proper use and possible adverse effects of methotrexate.  All of the patient's questions and concerns were addressed. Clindamycin Counseling: I counseled the patient regarding use of clindamycin as an antibiotic for prophylactic and/or therapeutic purposes. Clindamycin is active against numerous classes of bacteria, including skin bacteria. Side effects may include nausea, diarrhea, gastrointestinal upset, rash, hives, yeast infections, and in rare cases, colitis. Finasteride Pregnancy And Lactation Text: This medication is absolutely contraindicated during pregnancy. It is unknown if it is excreted in breast milk. Picato Counseling:  I discussed with the patient the risks of Picato including but not limited to erythema, scaling, itching, weeping, crusting, and pain. Calcipotriene Counseling:  I discussed with the patient the risks of calcipotriene including but not limited to erythema, scaling, itching, and irritation. Mirvaso Pregnancy And Lactation Text: This medication has not been assigned a Pregnancy Risk Category. It is unknown if the medication is excreted in breast milk. Hydroxyzine Pregnancy And Lactation Text: This medication is not safe during pregnancy and should not be taken. It is also excreted in breast milk and breast feeding isn't recommended. Oxybutynin Counseling:  I discussed with the patient the risks of oxybutynin including but not limited to skin rash, drowsiness, dry mouth, difficulty urinating, and blurred vision. Clindamycin Pregnancy And Lactation Text: This medication can be used in pregnancy if certain situations. Clindamycin is also present in breast milk. Calcipotriene Pregnancy And Lactation Text: This medication has not been proven safe during pregnancy. It is unknown if this medication is excreted in breast milk. Propranolol Counseling:  I discussed with the patient the risks of propranolol including but not limited to low heart rate, low blood pressure, low blood sugar, restlessness and increased cold sensitivity. They should call the office if they experience any of these side effects. Methotrexate Pregnancy And Lactation Text: This medication is Pregnancy Category X and is known to cause fetal harm. This medication is excreted in breast milk. Gabapentin Counseling: I discussed with the patient the risks of gabapentin including but not limited to dizziness, somnolence, fatigue and ataxia. Oxybutynin Pregnancy And Lactation Text: This medication is Pregnancy Category B and is considered safe during pregnancy. It is unknown if it is excreted in breast milk. Xeljanz Counseling: I discussed with the patient the risks of Xeljanz therapy including increased risk of infection, liver issues, headache, diarrhea, or cold symptoms. Live vaccines should be avoided. They were instructed to call if they have any problems. Zyclara Counseling:  I discussed with the patient the risks of imiquimod including but not limited to erythema, scaling, itching, weeping, crusting, and pain.  Patient understands that the inflammatory response to imiquimod is variable from person to person and was educated regarded proper titration schedule.  If flu-like symptoms develop, patient knows to discontinue the medication and contact us. Ilumya Counseling: I discussed with the patient the risks of tildrakizumab including but not limited to immunosuppression, malignancy, posterior leukoencephalopathy syndrome, and serious infections.  The patient understands that monitoring is required including a PPD at baseline and must alert us or the primary physician if symptoms of infection or other concerning signs are noted. Propranolol Pregnancy And Lactation Text: This medication is Pregnancy Category C and it isn't known if it is safe during pregnancy. It is excreted in breast milk. Gabapentin Pregnancy And Lactation Text: This medication is Pregnancy Category C and isn't considered safe during pregnancy. It is excreted in breast milk. Fluconazole Pregnancy And Lactation Text: This medication is Pregnancy Category C and it isn't know if it is safe during pregnancy. It is also excreted in breast milk. Protopic Counseling: Patient may experience a mild burning sensation during topical application. Protopic is not approved in children less than 2 years of age. There have been case reports of hematologic and skin malignancies in patients using topical calcineurin inhibitors although causality is questionable. Doxycycline Counseling:  Patient counseled regarding possible photosensitivity and increased risk for sunburn.  Patient instructed to avoid sunlight, if possible.  When exposed to sunlight, patients should wear protective clothing, sunglasses, and sunscreen.  The patient was instructed to call the office immediately if the following severe adverse effects occur:  hearing changes, easy bruising/bleeding, severe headache, or vision changes.  The patient verbalized understanding of the proper use and possible adverse effects of doxycycline.  All of the patient's questions and concerns were addressed. Albendazole Pregnancy And Lactation Text: This medication is Pregnancy Category C and it isn't known if it is safe during pregnancy. It is also excreted in breast milk. Prednisone Counseling:  I discussed with the patient the risks of prolonged use of prednisone including but not limited to weight gain, insomnia, osteoporosis, mood changes, diabetes, susceptibility to infection, glaucoma and high blood pressure.  In cases where prednisone use is prolonged, patients should be monitored with blood pressure checks, serum glucose levels and an eye exam.  Additionally, the patient may need to be placed on GI prophylaxis, PCP prophylaxis, and calcium and vitamin D supplementation and/or a bisphosphonate.  The patient verbalized understanding of the proper use and the possible adverse effects of prednisone.  All of the patient's questions and concerns were addressed. Zyclara Pregnancy And Lactation Text: This medication is Pregnancy Category C. It is unknown if this medication is excreted in breast milk. Fluconazole Counseling:  Patient counseled regarding adverse effects of fluconazole including but not limited to headache, diarrhea, nausea, upset stomach, liver function test abnormalities, taste disturbance, and stomach pain.  There is a rare possibility of liver failure that can occur when taking fluconazole.  The patient understands that monitoring of LFTs and kidney function test may be required, especially at baseline. The patient verbalized understanding of the proper use and possible adverse effects of fluconazole.  All of the patient's questions and concerns were addressed. Albendazole Counseling:  I discussed with the patient the risks of albendazole including but not limited to cytopenia, kidney damage, nausea/vomiting and severe allergy.  The patient understands that this medication is being used in an off-label manner. 5-Fu Counseling: 5-Fluorouracil Counseling:  I discussed with the patient the risks of 5-fluorouracil including but not limited to erythema, scaling, itching, weeping, crusting, and pain. Xelalma rosaz Pregnancy And Lactation Text: This medication is Pregnancy Category D and is not considered safe during pregnancy.  The risk during breast feeding is also uncertain. Doxycycline Pregnancy And Lactation Text: This medication is Pregnancy Category D and not consider safe during pregnancy. It is also excreted in breast milk but is considered safe for shorter treatment courses. Birth Control Pills Counseling: Birth Control Pill Counseling: I discussed with the patient the potential side effects of OCPs including but not limited to increased risk of stroke, heart attack, thrombophlebitis, deep venous thrombosis, hepatic adenomas, breast changes, GI upset, headaches, and depression.  The patient verbalized understanding of the proper use and possible adverse effects of OCPs. All of the patient's questions and concerns were addressed. Ivermectin Counseling:  Patient instructed to take medication on an empty stomach with a full glass of water.  Patient informed of potential adverse effects including but not limited to nausea, diarrhea, dizziness, itching, and swelling of the extremities or lymph nodes.  The patient verbalized understanding of the proper use and possible adverse effects of ivermectin.  All of the patient's questions and concerns were addressed. Protopic Pregnancy And Lactation Text: This medication is Pregnancy Category C. It is unknown if this medication is excreted in breast milk when applied topically. Infliximab Counseling:  I discussed with the patient the risks of infliximab including but not limited to myelosuppression, immunosuppression, autoimmune hepatitis, demyelinating diseases, lymphoma, and serious infections.  The patient understands that monitoring is required including a PPD at baseline and must alert us or the primary physician if symptoms of infection or other concerning signs are noted. Xolair Counseling:  Patient informed of potential adverse effects including but not limited to fever, muscle aches, rash and allergic reactions.  The patient verbalized understanding of the proper use and possible adverse effects of Xolair.  All of the patient's questions and concerns were addressed. Glycopyrrolate Counseling:  I discussed with the patient the risks of glycopyrrolate including but not limited to skin rash, drowsiness, dry mouth, difficulty urinating, and blurred vision. Rhofade Counseling: Rhofade is a topical medication which can decrease superficial blood flow where applied. Side effects are uncommon and include stinging, redness and allergic reactions. Drysol Counseling:  I discussed with the patient the risks of drysol/aluminum chloride including but not limited to skin rash, itching, irritation, burning. Detail Level: Simple Griseofulvin Pregnancy And Lactation Text: This medication is Pregnancy Category X and is known to cause serious birth defects. It is unknown if this medication is excreted in breast milk but breast feeding should be avoided. Griseofulvin Counseling:  I discussed with the patient the risks of griseofulvin including but not limited to photosensitivity, cytopenia, liver damage, nausea/vomiting and severe allergy.  The patient understands that this medication is best absorbed when taken with a fatty meal (e.g., ice cream or french fries). Opioid Counseling: I discussed with the patient the potential side effects of opioids including but not limited to addiction, altered mental status, and depression. I stressed avoiding alcohol, benzodiazepines, muscle relaxants and sleep aids unless specifically okayed by a physician. The patient verbalized understanding of the proper use and possible adverse effects of opioids. All of the patient's questions and concerns were addressed. They were instructed to flush the remaining pills down the toilet if they did not need them for pain. Birth Control Pills Pregnancy And Lactation Text: This medication should be avoided if pregnant and for the first 30 days post-partum. Erythromycin Counseling:  I discussed with the patient the risks of erythromycin including but not limited to GI upset, allergic reaction, drug rash, diarrhea, increase in liver enzymes, and yeast infections. Glycopyrrolate Pregnancy And Lactation Text: This medication is Pregnancy Category B and is considered safe during pregnancy. It is unknown if it is excreted breast milk. Itraconazole Counseling:  I discussed with the patient the risks of itraconazole including but not limited to liver damage, nausea/vomiting, neuropathy, and severe allergy.  The patient understands that this medication is best absorbed when taken with acidic beverages such as non-diet cola or ginger ale.  The patient understands that monitoring is required including baseline LFTs and repeat LFTs at intervals.  The patient understands that they are to contact us or the primary physician if concerning signs are noted. Rituxan Pregnancy And Lactation Text: This medication is Pregnancy Category C and it isn't know if it is safe during pregnancy. It is unknown if this medication is excreted in breast milk but similar antibodies are known to be excreted. Spironolactone Counseling: Patient advised regarding risks of diarrhea, abdominal pain, hyperkalemia, birth defects (for female patients), liver toxicity and renal toxicity. The patient may need blood work to monitor liver and kidney function and potassium levels while on therapy. The patient verbalized understanding of the proper use and possible adverse effects of spironolactone.  All of the patient's questions and concerns were addressed. Hydroxychloroquine Counseling:  I discussed with the patient that a baseline ophthalmologic exam is needed at the start of therapy and every year thereafter while on therapy. A CBC may also be warranted for monitoring.  The side effects of this medication were discussed with the patient, including but not limited to agranulocytosis, aplastic anemia, seizures, rashes, retinopathy, and liver toxicity. Patient instructed to call the office should any adverse effect occur.  The patient verbalized understanding of the proper use and possible adverse effects of Plaquenil.  All the patient's questions and concerns were addressed. Opioid Pregnancy And Lactation Text: These medications can lead to premature delivery and should be avoided during pregnancy. These medications are also present in breast milk in small amounts. Rituxan Counseling:  I discussed with the patient the risks of Rituxan infusions. Side effects can include infusion reactions, severe drug rashes including mucocutaneous reactions, reactivation of latent hepatitis and other infections and rarely progressive multifocal leukoencephalopathy.  All of the patient's questions and concerns were addressed. Erythromycin Pregnancy And Lactation Text: This medication is Pregnancy Category B and is considered safe during pregnancy. It is also excreted in breast milk. Acitretin Counseling:  I discussed with the patient the risks of acitretin including but not limited to hair loss, dry lips/skin/eyes, liver damage, hyperlipidemia, depression/suicidal ideation, photosensitivity.  Serious rare side effects can include but are not limited to pancreatitis, pseudotumor cerebri, bony changes, clot formation/stroke/heart attack.  Patient understands that alcohol is contraindicated since it can result in liver toxicity and significantly prolong the elimination of the drug by many years. Drysol Pregnancy And Lactation Text: This medication is considered safe during pregnancy and breast feeding. Arava Counseling:  Patient counseled regarding adverse effects of Arava including but not limited to nausea, vomiting, abnormalities in liver function tests. Patients may develop mouth sores, rash, diarrhea, and abnormalities in blood counts. The patient understands that monitoring is required including LFTs and blood counts.  There is a rare possibility of scarring of the liver and lung problems that can occur when taking methotrexate. Persistent nausea, loss of appetite, pale stools, dark urine, cough, and shortness of breath should be reported immediately. Patient advised to discontinue Arava treatment and consult with a physician prior to attempting conception. The patient will have to undergo a treatment to eliminate Arava from the body prior to conception. Siliq Counseling:  I discussed with the patient the risks of Siliq including but not limited to new or worsening depression, suicidal thoughts and behavior, immunosuppression, malignancy, posterior leukoencephalopathy syndrome, and serious infections.  The patient understands that monitoring is required including a PPD at baseline and must alert us or the primary physician if symptoms of infection or other concerning signs are noted. There is also a special program designed to monitor depression which is required with Siliq. Metronidazole Counseling:  I discussed with the patient the risks of metronidazole including but not limited to seizures, nausea/vomiting, a metallic taste in the mouth, nausea/vomiting and severe allergy. Elidel Counseling: Patient may experience a mild burning sensation during topical application. Elidel is not approved in children less than 2 years of age. There have been case reports of hematologic and skin malignancies in patients using topical calcineurin inhibitors although causality is questionable. Spironolactone Pregnancy And Lactation Text: This medication can cause feminization of the male fetus and should be avoided during pregnancy. The active metabolite is also found in breast milk. Acitretin Pregnancy And Lactation Text: This medication is Pregnancy Category X and should not be given to women who are pregnant or may become pregnant in the future. This medication is excreted in breast milk. Hydroxychloroquine Pregnancy And Lactation Text: This medication has been shown to cause fetal harm but it isn't assigned a Pregnancy Risk Category. There are small amounts excreted in breast milk. Solaraze Counseling:  I discussed with the patient the risks of Solaraze including but not limited to erythema, scaling, itching, weeping, crusting, and pain. Eucrisa Counseling: Patient may experience a mild burning sensation during topical application. Eucrisa is not approved in children less than 2 years of age. Libtayo Counseling- I discussed with the patient the risks of Libtayo including but not limited to nausea, vomiting, diarrhea, and bone or muscle pain.  The patient verbalized understanding of the proper use and possible adverse effects of Libtayo.  All of the patient's questions and concerns were addressed. Metronidazole Pregnancy And Lactation Text: This medication is Pregnancy Category B and considered safe during pregnancy.  It is also excreted in breast milk. Solaraze Pregnancy And Lactation Text: This medication is Pregnancy Category B and is considered safe. There is some data to suggest avoiding during the third trimester. It is unknown if this medication is excreted in breast milk. Ketoconazole Counseling:   Patient counseled regarding improving absorption with orange juice.  Adverse effects include but are not limited to breast enlargement, headache, diarrhea, nausea, upset stomach, liver function test abnormalities, taste disturbance, and stomach pain.  There is a rare possibility of liver failure that can occur when taking ketoconazole. The patient understands that monitoring of LFTs may be required, especially at baseline. The patient verbalized understanding of the proper use and possible adverse effects of ketoconazole.  All of the patient's questions and concerns were addressed. SSKI Counseling:  I discussed with the patient the risks of SSKI including but not limited to thyroid abnormalities, metallic taste, GI upset, fever, headache, acne, arthralgias, paraesthesias, lymphadenopathy, easy bleeding, arrhythmias, and allergic reaction. Bexarotene Counseling:  I discussed with the patient the risks of bexarotene including but not limited to hair loss, dry lips/skin/eyes, liver abnormalities, hyperlipidemia, pancreatitis, depression/suicidal ideation, photosensitivity, drug rash/allergic reactions, hypothyroidism, anemia, leukopenia, infection, cataracts, and teratogenicity.  Patient understands that they will need regular blood tests to check lipid profile, liver function tests, white blood cell count, thyroid function tests and pregnancy test if applicable. Thalidomide Counseling: I discussed with the patient the risks of thalidomide including but not limited to birth defects, anxiety, weakness, chest pain, dizziness, cough and severe allergy. Isotretinoin Counseling: Patient should get monthly blood tests, not donate blood, not drive at night if vision affected, not share medication, and not undergo elective surgery for 6 months after tx completed. Side effects reviewed, pt to contact office should one occur. Niacinamide Counseling: I recommended taking niacin or niacinamide, also know as vitamin B3, twice daily. Recent evidence suggests that taking vitamin B3 (500 mg twice daily) can reduce the risk of actinic keratoses and non-melanoma skin cancers. Side effects of vitamin B3 include flushing and headache. Clofazimine Counseling:  I discussed with the patient the risks of clofazimine including but not limited to skin and eye pigmentation, liver damage, nausea/vomiting, gastrointestinal bleeding and allergy. Bexarotene Pregnancy And Lactation Text: This medication is Pregnancy Category X and should not be given to women who are pregnant or may become pregnant. This medication should not be used if you are breast feeding. Ketoconazole Pregnancy And Lactation Text: This medication is Pregnancy Category C and it isn't know if it is safe during pregnancy. It is also excreted in breast milk and breast feeding isn't recommended. Sski Pregnancy And Lactation Text: This medication is Pregnancy Category D and isn't considered safe during pregnancy. It is excreted in breast milk. Minocycline Counseling: Patient advised regarding possible photosensitivity and discoloration of the teeth, skin, lips, tongue and gums.  Patient instructed to avoid sunlight, if possible.  When exposed to sunlight, patients should wear protective clothing, sunglasses, and sunscreen.  The patient was instructed to call the office immediately if the following severe adverse effects occur:  hearing changes, easy bruising/bleeding, severe headache, or vision changes.  The patient verbalized understanding of the proper use and possible adverse effects of minocycline.  All of the patient's questions and concerns were addressed. Xolair Pregnancy And Lactation Text: This medication is Pregnancy Category B and is considered safe during pregnancy. This medication is excreted in breast milk. Libtayo Pregnancy And Lactation Text: This medication is contraindicated in pregnancy and when breast feeding. Simponi Counseling:  I discussed with the patient the risks of golimumab including but not limited to myelosuppression, immunosuppression, autoimmune hepatitis, demyelinating diseases, lymphoma, and serious infections.  The patient understands that monitoring is required including a PPD at baseline and must alert us or the primary physician if symptoms of infection or other concerning signs are noted. Cimzia Counseling:  I discussed with the patient the risks of Cimzia including but not limited to immunosuppression, allergic reactions and infections.  The patient understands that monitoring is required including a PPD at baseline and must alert us or the primary physician if symptoms of infection or other concerning signs are noted. Topical Retinoid counseling:  Patient advised to apply a pea-sized amount only at bedtime and wait 30 minutes after washing their face before applying.  If too drying, patient may add a non-comedogenic moisturizer. The patient verbalized understanding of the proper use and possible adverse effects of retinoids.  All of the patient's questions and concerns were addressed. Azathioprine Counseling:  I discussed with the patient the risks of azathioprine including but not limited to myelosuppression, immunosuppression, hepatotoxicity, lymphoma, and infections.  The patient understands that monitoring is required including baseline LFTs, Creatinine, possible TPMP genotyping and weekly CBCs for the first month and then every 2 weeks thereafter.  The patient verbalized understanding of the proper use and possible adverse effects of azathioprine.  All of the patient's questions and concerns were addressed. Niacinamide Pregnancy And Lactation Text: These medications are considered safe during pregnancy. Tazorac Counseling:  Patient advised that medication is irritating and drying.  Patient may need to apply sparingly and wash off after an hour before eventually leaving it on overnight.  The patient verbalized understanding of the proper use and possible adverse effects of tazorac.  All of the patient's questions and concerns were addressed. Quinolones Counseling:  I discussed with the patient the risks of fluoroquinolones including but not limited to GI upset, allergic reaction, drug rash, diarrhea, dizziness, photosensitivity, yeast infections, liver function test abnormalities, tendonitis/tendon rupture. Hydroquinone Counseling:  Patient advised that medication may result in skin irritation, lightening (hypopigmentation), dryness, and burning.  In the event of skin irritation, the patient was advised to reduce the amount of the drug applied or use it less frequently.  Rarely, spots that are treated with hydroquinone can become darker (pseudoochronosis).  Should this occur, patient instructed to stop medication and call the office. The patient verbalized understanding of the proper use and possible adverse effects of hydroquinone.  All of the patient's questions and concerns were addressed. Cosentyx Counseling:  I discussed with the patient the risks of Cosentyx including but not limited to worsening of Crohn's disease, immunosuppression, allergic reactions and infections.  The patient understands that monitoring is required including a PPD at baseline and must alert us or the primary physician if symptoms of infection or other concerning signs are noted. Isotretinoin Pregnancy And Lactation Text: This medication is Pregnancy Category X and is considered extremely dangerous during pregnancy. It is unknown if it is excreted in breast milk. Cimzia Pregnancy And Lactation Text: This medication crosses the placenta but can be considered safe in certain situations. Cimzia may be excreted in breast milk. Terbinafine Counseling: Patient counseling regarding adverse effects of terbinafine including but not limited to headache, diarrhea, rash, upset stomach, liver function test abnormalities, itching, taste/smell disturbance, nausea, abdominal pain, and flatulence.  There is a rare possibility of liver failure that can occur when taking terbinafine.  The patient understands that a baseline LFT and kidney function test may be required. The patient verbalized understanding of the proper use and possible adverse effects of terbinafine.  All of the patient's questions and concerns were addressed. High Dose Vitamin A Counseling: Side effects reviewed, pt to contact office should one occur. Azathioprine Pregnancy And Lactation Text: This medication is Pregnancy Category D and isn't considered safe during pregnancy. It is unknown if this medication is excreted in breast milk. Tranexamic Acid Counseling:  Patient advised of the small risk of bleeding problems with tranexamic acid. They were also instructed to call if they developed any nausea, vomiting or diarrhea. All of the patient's questions and concerns were addressed. Nsaids Counseling: NSAID Counseling: I discussed with the patient that NSAIDs should be taken with food. Prolonged use of NSAIDs can result in the development of stomach ulcers.  Patient advised to stop taking NSAIDs if abdominal pain occurs.  The patient verbalized understanding of the proper use and possible adverse effects of NSAIDs.  All of the patient's questions and concerns were addressed. Skyrizi Counseling: I discussed with the patient the risks of risankizumab-rzaa including but not limited to immunosuppression, and serious infections.  The patient understands that monitoring is required including a PPD at baseline and must alert us or the primary physician if symptoms of infection or other concerning signs are noted. Colchicine Counseling:  Patient counseled regarding adverse effects including but not limited to stomach upset (nausea, vomiting, stomach pain, or diarrhea).  Patient instructed to limit alcohol consumption while taking this medication.  Colchicine may reduce blood counts especially with prolonged use.  The patient understands that monitoring of kidney function and blood counts may be required, especially at baseline. The patient verbalized understanding of the proper use and possible adverse effects of colchicine.  All of the patient's questions and concerns were addressed. Terbinafine Pregnancy And Lactation Text: This medication is Pregnancy Category B and is considered safe during pregnancy. It is also excreted in breast milk and breast feeding isn't recommended. Nsaids Pregnancy And Lactation Text: These medications are considered safe up to 30 weeks gestation. It is excreted in breast milk. Imiquimod Counseling:  I discussed with the patient the risks of imiquimod including but not limited to erythema, scaling, itching, weeping, crusting, and pain.  Patient understands that the inflammatory response to imiquimod is variable from person to person and was educated regarded proper titration schedule.  If flu-like symptoms develop, patient knows to discontinue the medication and contact us. Rifampin Counseling: I discussed with the patient the risks of rifampin including but not limited to liver damage, kidney damage, red-orange body fluids, nausea/vomiting and severe allergy. Cimetidine Counseling:  I discussed with the patient the risks of Cimetidine including but not limited to gynecomastia, headache, diarrhea, nausea, drowsiness, arrhythmias, pancreatitis, skin rashes, psychosis, bone marrow suppression and kidney toxicity. Cellcept Counseling:  I discussed with the patient the risks of mycophenolate mofetil including but not limited to infection/immunosuppression, GI upset, hypokalemia, hypercholesterolemia, bone marrow suppression, lymphoproliferative disorders, malignancy, GI ulceration/bleed/perforation, colitis, interstitial lung disease, kidney failure, progressive multifocal leukoencephalopathy, and birth defects.  The patient understands that monitoring is required including a baseline creatinine and regular CBC testing. In addition, patient must alert us immediately if symptoms of infection or other concerning signs are noted. Tazorac Pregnancy And Lactation Text: This medication is not safe during pregnancy. It is unknown if this medication is excreted in breast milk. Tranexamic Acid Pregnancy And Lactation Text: It is unknown if this medication is safe during pregnancy or breast feeding. High Dose Vitamin A Pregnancy And Lactation Text: High dose vitamin A therapy is contraindicated during pregnancy and breast feeding. Azithromycin Counseling:  I discussed with the patient the risks of azithromycin including but not limited to GI upset, allergic reaction, drug rash, diarrhea, and yeast infections. Rifampin Pregnancy And Lactation Text: This medication is Pregnancy Category C and it isn't know if it is safe during pregnancy. It is also excreted in breast milk and should not be used if you are breast feeding. Odomzo Counseling- I discussed with the patient the risks of Odomzo including but not limited to nausea, vomiting, diarrhea, constipation, weight loss, changes in the sense of taste, decreased appetite, muscle spasms, and hair loss.  The patient verbalized understanding of the proper use and possible adverse effects of Odomzo.  All of the patient's questions and concerns were addressed. Dupixent Pregnancy And Lactation Text: This medication likely crosses the placenta but the risk for the fetus is uncertain. This medication is excreted in breast milk. Valtrex Counseling: I discussed with the patient the risks of valacyclovir including but not limited to kidney damage, nausea, vomiting and severe allergy.  The patient understands that if the infection seems to be worsening or is not improving, they are to call. Dupixent Counseling: I discussed with the patient the risks of dupilumab including but not limited to eye infection and irritation, cold sores, injection site reactions, worsening of asthma, allergic reactions and increased risk of parasitic infection.  Live vaccines should be avoided while taking dupilumab. Dupilumab will also interact with certain medications such as warfarin and cyclosporine. The patient understands that monitoring is required and they must alert us or the primary physician if symptoms of infection or other concerning signs are noted. Azithromycin Pregnancy And Lactation Text: This medication is considered safe during pregnancy and is also secreted in breast milk. Dapsone Counseling: I discussed with the patient the risks of dapsone including but not limited to hemolytic anemia, agranulocytosis, rashes, methemoglobinemia, kidney failure, peripheral neuropathy, headaches, GI upset, and liver toxicity.  Patients who start dapsone require monitoring including baseline LFTs and weekly CBCs for the first month, then every month thereafter.  The patient verbalized understanding of the proper use and possible adverse effects of dapsone.  All of the patient's questions and concerns were addressed. Stelara Counseling:  I discussed with the patient the risks of ustekinumab including but not limited to immunosuppression, malignancy, posterior leukoencephalopathy syndrome, and serious infections.  The patient understands that monitoring is required including a PPD at baseline and must alert us or the primary physician if symptoms of infection or other concerning signs are noted. Topical Clindamycin Counseling: Patient counseled that this medication may cause skin irritation or allergic reactions.  In the event of skin irritation, the patient was advised to reduce the amount of the drug applied or use it less frequently.   The patient verbalized understanding of the proper use and possible adverse effects of clindamycin.  All of the patient's questions and concerns were addressed.

## 2022-11-17 RX ORDER — NIFEDIPINE 30 MG/1
TABLET, EXTENDED RELEASE ORAL
Qty: 60 TABLET | Refills: 1 | Status: SHIPPED | OUTPATIENT
Start: 2022-11-17

## 2023-02-15 RX ORDER — NIFEDIPINE 30 MG/1
TABLET, EXTENDED RELEASE ORAL
Qty: 60 TABLET | Refills: 1 | Status: SHIPPED | OUTPATIENT
Start: 2023-02-15

## 2023-12-18 RX ORDER — NIFEDIPINE 30 MG/1
30 TABLET, EXTENDED RELEASE ORAL EVERY 12 HOURS
Qty: 60 TABLET | Refills: 1 | OUTPATIENT
Start: 2023-12-18

## 2024-01-22 RX ORDER — NIFEDIPINE 30 MG/1
30 TABLET, EXTENDED RELEASE ORAL EVERY 12 HOURS
Qty: 60 TABLET | Refills: 1 | OUTPATIENT
Start: 2024-01-22

## (undated) DEVICE — PAD,ABDOMINAL,5"X9",ST,LF,25/BX: Brand: MEDLINE INDUSTRIES, INC.

## (undated) DEVICE — (D)PREP SKN CHLRAPRP APPL 26ML -- CONVERT TO ITEM 371833

## (undated) DEVICE — SYR LR LCK 1ML GRAD NSAF 30ML --

## (undated) DEVICE — DRAPE TWL SURG 16X26IN BLU ORB04] ALLCARE INC]

## (undated) DEVICE — LITHOTOMY: Brand: MEDLINE INDUSTRIES, INC.

## (undated) DEVICE — SOLUTION IV 1000ML 0.9% SOD CHL

## (undated) DEVICE — SOLUTION LACTATED RINGERS INJECTION USP

## (undated) DEVICE — 2000CC GUARDIAN II: Brand: GUARDIAN

## (undated) DEVICE — INTENDED FOR TISSUE SEPARATION, AND OTHER PROCEDURES THAT REQUIRE A SHARP SURGICAL BLADE TO PUNCTURE OR CUT.: Brand: BARD-PARKER ® STAINLESS STEEL BLADES

## (undated) DEVICE — STRIP,CLOSURE,WOUND,MEDI-STRIP,1/2X4: Brand: MEDLINE

## (undated) DEVICE — TETRA-FLEX CF WOVEN LATEX FREE ELASTIC BANDAGE 6" X 11 YD: Brand: TETRA-FLEX™CF

## (undated) DEVICE — Z INACTIVE USE 2527070 DRAPE SURG W40XL44IN UNDERBUTTOCK SMS POLYPR W/ PCH BK DISP

## (undated) DEVICE — CONTAINER SPEC HISTOLOGY 900ML POLYPR

## (undated) DEVICE — DRESSING,GAUZE,XEROFORM,CURAD,5"X9",ST: Brand: CURAD

## (undated) DEVICE — X-RAY SPONGES,12 PLY: Brand: DERMACEA

## (undated) DEVICE — PREMIUM WET SKIN PREP TRAY: Brand: MEDLINE INDUSTRIES, INC.

## (undated) DEVICE — REM POLYHESIVE ADULT PATIENT RETURN ELECTRODE: Brand: VALLEYLAB

## (undated) DEVICE — SUTURE PROL 2 L60IN NONABSORBABLE BLU TP 1 L65MM 1 2 CIR 8825G

## (undated) DEVICE — STAPLER SKIN SQ 30 ABSRB STPL DISP INSORB

## (undated) DEVICE — BLADE SCALP SURG BARD-PARK 10 --

## (undated) DEVICE — TUBING SUCT L9FT FOR AUTOFUSE INFLTR SYS

## (undated) DEVICE — DRAPE,CHEST,FENES,15X10,STERIL: Brand: MEDLINE

## (undated) DEVICE — GARMENT,MEDLINE,DVT,INT,CALF,MED, GEN2: Brand: MEDLINE

## (undated) DEVICE — SINGLE BASIN: Brand: CARDINAL HEALTH

## (undated) DEVICE — GOWN,REINF,POLY,ECL,PP SLV,XL: Brand: MEDLINE

## (undated) DEVICE — SOLUTION IRRIG 1000ML H2O STRL BLT

## (undated) DEVICE — STERILE POLYISOPRENE POWDER-FREE SURGICAL GLOVES: Brand: PROTEXIS

## (undated) DEVICE — SPONGE LAP 18X18IN STRL -- 5/PK

## (undated) DEVICE — SUTURE ABSRB X-1 REV CUT 1/2 CIR 22MM UD BRAID 27IN SZ 3-0 J458H

## (undated) DEVICE — SUTURE PDS II SZ 4-0 L18IN ABSRB UD L19MM PS-2 3/8 CIR PRIM Z496G

## (undated) DEVICE — SUTURE VCRL SZ 2-0 L36IN ABSRB UD L36MM CT-1 1/2 CIR J945H

## (undated) DEVICE — Device: Brand: MEDCO MANUFACTURING INC

## (undated) DEVICE — GAUZE,SPONGE,4"X4",16PLY,STRL,LF,10/TRAY: Brand: MEDLINE

## (undated) DEVICE — STERILE TETRA-FLEX CF LF, 6IN X 11 YD: Brand: TETRA-FLEX™ CF

## (undated) DEVICE — SURGICAL PROCEDURE PACK BASIC ST FRANCIS

## (undated) DEVICE — SUTURE ABSORBABLE BRAIDED 2-0 CT-1 27 IN UD VICRYL J259H

## (undated) DEVICE — DRAPE,TOP,102X53,STERILE: Brand: MEDLINE

## (undated) DEVICE — NEEDLE HYPO 18GA L1.5IN PNK S STL HUB POLYPR SHLD REG BVL